# Patient Record
Sex: MALE | Race: WHITE | NOT HISPANIC OR LATINO | Employment: OTHER | URBAN - METROPOLITAN AREA
[De-identification: names, ages, dates, MRNs, and addresses within clinical notes are randomized per-mention and may not be internally consistent; named-entity substitution may affect disease eponyms.]

---
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
## 2024-05-03 ENCOUNTER — HOSPITAL ENCOUNTER (INPATIENT)
Facility: HOSPITAL | Age: 54
LOS: 3 days | DRG: 683 | End: 2024-05-06
Attending: EMERGENCY MEDICINE | Admitting: INTERNAL MEDICINE
Payer: MEDICARE

## 2024-05-03 ENCOUNTER — APPOINTMENT (INPATIENT)
Dept: RADIOLOGY | Facility: HOSPITAL | Age: 54
DRG: 683 | End: 2024-05-03
Payer: MEDICARE

## 2024-05-03 ENCOUNTER — APPOINTMENT (EMERGENCY)
Dept: RADIOLOGY | Facility: HOSPITAL | Age: 54
DRG: 683 | End: 2024-05-03
Payer: MEDICARE

## 2024-05-03 DIAGNOSIS — E87.1 HYPONATREMIA: ICD-10-CM

## 2024-05-03 DIAGNOSIS — R44.0 AUDITORY HALLUCINATION: ICD-10-CM

## 2024-05-03 DIAGNOSIS — R45.851 SUICIDAL IDEATION: Primary | ICD-10-CM

## 2024-05-03 DIAGNOSIS — Z00.8 MEDICAL CLEARANCE FOR PSYCHIATRIC ADMISSION: ICD-10-CM

## 2024-05-03 DIAGNOSIS — N17.9 AKI (ACUTE KIDNEY INJURY) (HCC): ICD-10-CM

## 2024-05-03 DIAGNOSIS — D72.829 LEUKOCYTOSIS: ICD-10-CM

## 2024-05-03 PROBLEM — M79.641 BILATERAL HAND PAIN: Status: ACTIVE | Noted: 2024-05-03

## 2024-05-03 PROBLEM — M79.642 BILATERAL HAND PAIN: Status: ACTIVE | Noted: 2024-05-03

## 2024-05-03 PROBLEM — F25.9 SCHIZOAFFECTIVE DISORDER (HCC): Status: ACTIVE | Noted: 2024-05-03

## 2024-05-03 PROBLEM — F11.10 OPIOID ABUSE (HCC): Status: ACTIVE | Noted: 2024-05-03

## 2024-05-03 PROBLEM — M62.82 RHABDOMYOLYSIS: Status: ACTIVE | Noted: 2024-05-03

## 2024-05-03 LAB
ALBUMIN SERPL BCP-MCNC: 4.3 G/DL (ref 3.5–5)
ALP SERPL-CCNC: 79 U/L (ref 34–104)
ALT SERPL W P-5'-P-CCNC: 44 U/L (ref 7–52)
AMPHETAMINES SERPL QL SCN: NEGATIVE
ANION GAP SERPL CALCULATED.3IONS-SCNC: 10 MMOL/L (ref 4–13)
ANION GAP SERPL CALCULATED.3IONS-SCNC: 11 MMOL/L (ref 4–13)
AST SERPL W P-5'-P-CCNC: 116 U/L (ref 13–39)
ATRIAL RATE: 93 BPM
BACTERIA UR QL AUTO: ABNORMAL /HPF
BARBITURATES UR QL: POSITIVE
BASOPHILS # BLD AUTO: 0.06 THOUSANDS/ÂΜL (ref 0–0.1)
BASOPHILS NFR BLD AUTO: 0 % (ref 0–1)
BENZODIAZ UR QL: NEGATIVE
BILIRUB SERPL-MCNC: 0.83 MG/DL (ref 0.2–1)
BILIRUB UR QL STRIP: NEGATIVE
BUN SERPL-MCNC: 20 MG/DL (ref 5–25)
BUN SERPL-MCNC: 30 MG/DL (ref 5–25)
CALCIUM SERPL-MCNC: 8.3 MG/DL (ref 8.4–10.2)
CALCIUM SERPL-MCNC: 9 MG/DL (ref 8.4–10.2)
CHLORIDE SERPL-SCNC: 103 MMOL/L (ref 96–108)
CHLORIDE SERPL-SCNC: 92 MMOL/L (ref 96–108)
CK SERPL-CCNC: 6000 U/L (ref 39–308)
CLARITY UR: CLEAR
CO2 SERPL-SCNC: 22 MMOL/L (ref 21–32)
CO2 SERPL-SCNC: 24 MMOL/L (ref 21–32)
COARSE GRAN CASTS URNS QL MICRO: ABNORMAL /LPF
COCAINE UR QL: POSITIVE
COLOR UR: YELLOW
CREAT SERPL-MCNC: 1.32 MG/DL (ref 0.6–1.3)
CREAT SERPL-MCNC: 1.85 MG/DL (ref 0.6–1.3)
EOSINOPHIL # BLD AUTO: 0.02 THOUSAND/ÂΜL (ref 0–0.61)
EOSINOPHIL NFR BLD AUTO: 0 % (ref 0–6)
ERYTHROCYTE [DISTWIDTH] IN BLOOD BY AUTOMATED COUNT: 11.9 % (ref 11.6–15.1)
ETHANOL EXG-MCNC: 0 MG/DL
FENTANYL UR QL SCN: NEGATIVE
GFR SERPL CREATININE-BSD FRML MDRD: 40 ML/MIN/1.73SQ M
GFR SERPL CREATININE-BSD FRML MDRD: 61 ML/MIN/1.73SQ M
GLUCOSE SERPL-MCNC: 148 MG/DL (ref 65–140)
GLUCOSE SERPL-MCNC: 96 MG/DL (ref 65–140)
GLUCOSE UR STRIP-MCNC: NEGATIVE MG/DL
HCT VFR BLD AUTO: 38.9 % (ref 36.5–49.3)
HGB BLD-MCNC: 12.9 G/DL (ref 12–17)
HGB UR QL STRIP.AUTO: 250
HYALINE CASTS #/AREA URNS LPF: ABNORMAL /LPF
HYDROCODONE UR QL SCN: NEGATIVE
IMM GRANULOCYTES # BLD AUTO: 0.13 THOUSAND/UL (ref 0–0.2)
IMM GRANULOCYTES NFR BLD AUTO: 1 % (ref 0–2)
KETONES UR STRIP-MCNC: NEGATIVE MG/DL
LEUKOCYTE ESTERASE UR QL STRIP: NEGATIVE
LYMPHOCYTES # BLD AUTO: 2.99 THOUSANDS/ÂΜL (ref 0.6–4.47)
LYMPHOCYTES NFR BLD AUTO: 14 % (ref 14–44)
MCH RBC QN AUTO: 32.1 PG (ref 26.8–34.3)
MCHC RBC AUTO-ENTMCNC: 33.2 G/DL (ref 31.4–37.4)
MCV RBC AUTO: 97 FL (ref 82–98)
METHADONE UR QL: NEGATIVE
MONOCYTES # BLD AUTO: 1.87 THOUSAND/ÂΜL (ref 0.17–1.22)
MONOCYTES NFR BLD AUTO: 9 % (ref 4–12)
MUCOUS THREADS UR QL AUTO: ABNORMAL
NEUTROPHILS # BLD AUTO: 16.23 THOUSANDS/ÂΜL (ref 1.85–7.62)
NEUTS SEG NFR BLD AUTO: 76 % (ref 43–75)
NITRITE UR QL STRIP: NEGATIVE
NON-SQ EPI CELLS URNS QL MICRO: ABNORMAL /HPF
NRBC BLD AUTO-RTO: 0 /100 WBCS
OPIATES UR QL SCN: NEGATIVE
OSMOLALITY UR/SERPL-RTO: 288 MMOL/KG (ref 282–298)
OSMOLALITY UR: 151 MMOL/KG
OTHER STN SPEC: ABNORMAL
OXYCODONE+OXYMORPHONE UR QL SCN: NEGATIVE
P AXIS: 22 DEGREES
PCP UR QL: NEGATIVE
PH UR STRIP.AUTO: 5 [PH]
PLATELET # BLD AUTO: 316 THOUSANDS/UL (ref 149–390)
PMV BLD AUTO: 8.8 FL (ref 8.9–12.7)
POTASSIUM SERPL-SCNC: 3.8 MMOL/L (ref 3.5–5.3)
POTASSIUM SERPL-SCNC: 4.4 MMOL/L (ref 3.5–5.3)
PR INTERVAL: 150 MS
PROT SERPL-MCNC: 7.3 G/DL (ref 6.4–8.4)
PROT UR STRIP-MCNC: ABNORMAL MG/DL
QRS AXIS: 46 DEGREES
QRSD INTERVAL: 88 MS
QT INTERVAL: 344 MS
QTC INTERVAL: 427 MS
RBC # BLD AUTO: 4.02 MILLION/UL (ref 3.88–5.62)
RBC #/AREA URNS AUTO: ABNORMAL /HPF
SODIUM 24H UR-SCNC: 16 MOL/L
SODIUM SERPL-SCNC: 125 MMOL/L (ref 135–147)
SODIUM SERPL-SCNC: 137 MMOL/L (ref 135–147)
SP GR UR STRIP.AUTO: 1.01 (ref 1–1.04)
T WAVE AXIS: 22 DEGREES
THC UR QL: NEGATIVE
TSH SERPL DL<=0.05 MIU/L-ACNC: 1.3 UIU/ML (ref 0.45–4.5)
UROBILINOGEN UA: NEGATIVE MG/DL
VENTRICULAR RATE: 93 BPM
WBC # BLD AUTO: 21.3 THOUSAND/UL (ref 4.31–10.16)
WBC #/AREA URNS AUTO: ABNORMAL /HPF

## 2024-05-03 PROCEDURE — 81001 URINALYSIS AUTO W/SCOPE: CPT

## 2024-05-03 PROCEDURE — 96360 HYDRATION IV INFUSION INIT: CPT

## 2024-05-03 PROCEDURE — 36415 COLL VENOUS BLD VENIPUNCTURE: CPT

## 2024-05-03 PROCEDURE — 85025 COMPLETE CBC W/AUTO DIFF WBC: CPT

## 2024-05-03 PROCEDURE — 84443 ASSAY THYROID STIM HORMONE: CPT

## 2024-05-03 PROCEDURE — 99285 EMERGENCY DEPT VISIT HI MDM: CPT

## 2024-05-03 PROCEDURE — 83935 ASSAY OF URINE OSMOLALITY: CPT | Performed by: INTERNAL MEDICINE

## 2024-05-03 PROCEDURE — 93005 ELECTROCARDIOGRAM TRACING: CPT

## 2024-05-03 PROCEDURE — 80053 COMPREHEN METABOLIC PANEL: CPT

## 2024-05-03 PROCEDURE — 80048 BASIC METABOLIC PNL TOTAL CA: CPT | Performed by: NURSE PRACTITIONER

## 2024-05-03 PROCEDURE — 73120 X-RAY EXAM OF HAND: CPT

## 2024-05-03 PROCEDURE — 83930 ASSAY OF BLOOD OSMOLALITY: CPT | Performed by: INTERNAL MEDICINE

## 2024-05-03 PROCEDURE — 99223 1ST HOSP IP/OBS HIGH 75: CPT | Performed by: NURSE PRACTITIONER

## 2024-05-03 PROCEDURE — 84300 ASSAY OF URINE SODIUM: CPT | Performed by: INTERNAL MEDICINE

## 2024-05-03 PROCEDURE — 81003 URINALYSIS AUTO W/O SCOPE: CPT

## 2024-05-03 PROCEDURE — 93010 ELECTROCARDIOGRAM REPORT: CPT

## 2024-05-03 PROCEDURE — 71046 X-RAY EXAM CHEST 2 VIEWS: CPT

## 2024-05-03 PROCEDURE — 82075 ASSAY OF BREATH ETHANOL: CPT

## 2024-05-03 PROCEDURE — 82550 ASSAY OF CK (CPK): CPT | Performed by: INTERNAL MEDICINE

## 2024-05-03 PROCEDURE — 80307 DRUG TEST PRSMV CHEM ANLYZR: CPT

## 2024-05-03 PROCEDURE — 80076 HEPATIC FUNCTION PANEL: CPT | Performed by: INTERNAL MEDICINE

## 2024-05-03 PROCEDURE — 82550 ASSAY OF CK (CPK): CPT

## 2024-05-03 RX ORDER — DULOXETIN HYDROCHLORIDE 30 MG/1
30 CAPSULE, DELAYED RELEASE ORAL DAILY
COMMUNITY
Start: 2024-02-27 | End: 2024-05-10

## 2024-05-03 RX ORDER — HYDROXYZINE HYDROCHLORIDE 10 MG/1
10 TABLET, FILM COATED ORAL
COMMUNITY
Start: 2024-05-02 | End: 2024-05-10

## 2024-05-03 RX ORDER — ESCITALOPRAM OXALATE 5 MG/1
5 TABLET ORAL DAILY
COMMUNITY
Start: 2024-03-12 | End: 2024-05-10

## 2024-05-03 RX ORDER — LABETALOL HYDROCHLORIDE 5 MG/ML
10 INJECTION, SOLUTION INTRAVENOUS ONCE
Status: DISCONTINUED | OUTPATIENT
Start: 2024-05-03 | End: 2024-05-04

## 2024-05-03 RX ORDER — NICOTINE 21 MG/24HR
14 PATCH, TRANSDERMAL 24 HOURS TRANSDERMAL ONCE
Status: DISCONTINUED | OUTPATIENT
Start: 2024-05-03 | End: 2024-05-03

## 2024-05-03 RX ORDER — BUPROPION HYDROCHLORIDE 150 MG/1
150 TABLET ORAL EVERY MORNING
Status: DISCONTINUED | OUTPATIENT
Start: 2024-05-04 | End: 2024-05-05

## 2024-05-03 RX ORDER — BUPROPION HYDROCHLORIDE 150 MG/1
150 TABLET ORAL EVERY MORNING
COMMUNITY
Start: 2024-05-02 | End: 2024-05-10

## 2024-05-03 RX ORDER — GABAPENTIN 300 MG/1
300 CAPSULE ORAL 2 TIMES DAILY
Status: DISCONTINUED | OUTPATIENT
Start: 2024-05-03 | End: 2024-05-06 | Stop reason: HOSPADM

## 2024-05-03 RX ORDER — SODIUM CHLORIDE 9 MG/ML
125 INJECTION, SOLUTION INTRAVENOUS CONTINUOUS
Status: DISCONTINUED | OUTPATIENT
Start: 2024-05-03 | End: 2024-05-04

## 2024-05-03 RX ORDER — ACETAMINOPHEN 325 MG/1
650 TABLET ORAL EVERY 6 HOURS PRN
Status: DISCONTINUED | OUTPATIENT
Start: 2024-05-03 | End: 2024-05-06 | Stop reason: HOSPADM

## 2024-05-03 RX ORDER — LORAZEPAM 0.5 MG/1
0.5 TABLET ORAL DAILY PRN
COMMUNITY
Start: 2024-03-28 | End: 2024-05-10

## 2024-05-03 RX ORDER — PANTOPRAZOLE SODIUM 40 MG/1
40 TABLET, DELAYED RELEASE ORAL
Status: DISCONTINUED | OUTPATIENT
Start: 2024-05-04 | End: 2024-05-06 | Stop reason: HOSPADM

## 2024-05-03 RX ORDER — NORTRIPTYLINE HYDROCHLORIDE 25 MG/1
25 CAPSULE ORAL DAILY
Status: ON HOLD | COMMUNITY
Start: 2024-05-02 | End: 2024-05-09

## 2024-05-03 RX ORDER — LORAZEPAM 2 MG/ML
2 INJECTION INTRAMUSCULAR EVERY 6 HOURS PRN
Status: DISCONTINUED | OUTPATIENT
Start: 2024-05-03 | End: 2024-05-05

## 2024-05-03 RX ORDER — LANOLIN ALCOHOL/MO/W.PET/CERES
6 CREAM (GRAM) TOPICAL
Status: DISCONTINUED | OUTPATIENT
Start: 2024-05-03 | End: 2024-05-06 | Stop reason: HOSPADM

## 2024-05-03 RX ORDER — OMEPRAZOLE 20 MG/1
20 CAPSULE, DELAYED RELEASE ORAL 2 TIMES DAILY
Status: ON HOLD | COMMUNITY
Start: 2024-04-18 | End: 2024-05-09

## 2024-05-03 RX ORDER — LABETALOL HYDROCHLORIDE 5 MG/ML
10 INJECTION, SOLUTION INTRAVENOUS EVERY 4 HOURS PRN
Status: DISCONTINUED | OUTPATIENT
Start: 2024-05-03 | End: 2024-05-06 | Stop reason: HOSPADM

## 2024-05-03 RX ORDER — LORAZEPAM 0.5 MG/1
0.5 TABLET ORAL ONCE
Status: COMPLETED | OUTPATIENT
Start: 2024-05-03 | End: 2024-05-03

## 2024-05-03 RX ORDER — NICOTINE 21 MG/24HR
14 PATCH, TRANSDERMAL 24 HOURS TRANSDERMAL DAILY
Status: DISCONTINUED | OUTPATIENT
Start: 2024-05-03 | End: 2024-05-04

## 2024-05-03 RX ORDER — ALBUTEROL SULFATE 90 UG/1
2 AEROSOL, METERED RESPIRATORY (INHALATION) EVERY 6 HOURS PRN
COMMUNITY
End: 2024-05-10

## 2024-05-03 RX ORDER — HEPARIN SODIUM 5000 [USP'U]/ML
5000 INJECTION, SOLUTION INTRAVENOUS; SUBCUTANEOUS EVERY 8 HOURS SCHEDULED
Status: DISCONTINUED | OUTPATIENT
Start: 2024-05-03 | End: 2024-05-06 | Stop reason: HOSPADM

## 2024-05-03 RX ORDER — ALBUTEROL SULFATE 90 UG/1
2 AEROSOL, METERED RESPIRATORY (INHALATION) EVERY 6 HOURS PRN
Status: DISCONTINUED | OUTPATIENT
Start: 2024-05-03 | End: 2024-05-06 | Stop reason: HOSPADM

## 2024-05-03 RX ORDER — BUPRENORPHINE AND NALOXONE 8; 2 MG/1; MG/1
1 FILM, SOLUBLE BUCCAL; SUBLINGUAL 2 TIMES DAILY
COMMUNITY
End: 2024-05-10

## 2024-05-03 RX ORDER — BUPROPION HYDROCHLORIDE 150 MG/1
150 TABLET, FILM COATED, EXTENDED RELEASE ORAL DAILY
COMMUNITY
Start: 2024-03-24 | End: 2024-05-10

## 2024-05-03 RX ORDER — ZOLPIDEM TARTRATE 5 MG/1
5 TABLET ORAL
COMMUNITY
Start: 2024-02-29 | End: 2024-05-10

## 2024-05-03 RX ORDER — GABAPENTIN 300 MG/1
300 CAPSULE ORAL 3 TIMES DAILY
COMMUNITY
Start: 2024-05-02 | End: 2024-05-10

## 2024-05-03 RX ORDER — FLUTICASONE PROPIONATE 50 MCG
2 SPRAY, SUSPENSION (ML) NASAL DAILY
Status: ON HOLD | COMMUNITY
Start: 2024-05-02 | End: 2024-05-09

## 2024-05-03 RX ORDER — FLUTICASONE PROPIONATE 50 MCG
2 SPRAY, SUSPENSION (ML) NASAL DAILY
Status: DISCONTINUED | OUTPATIENT
Start: 2024-05-04 | End: 2024-05-06 | Stop reason: HOSPADM

## 2024-05-03 RX ORDER — NORTRIPTYLINE HYDROCHLORIDE 25 MG/1
25 CAPSULE ORAL DAILY
Status: DISCONTINUED | OUTPATIENT
Start: 2024-05-04 | End: 2024-05-06 | Stop reason: HOSPADM

## 2024-05-03 RX ORDER — OLANZAPINE 10 MG/1
10 TABLET ORAL
COMMUNITY
Start: 2024-05-02 | End: 2024-05-10

## 2024-05-03 RX ADMIN — SODIUM CHLORIDE 125 ML/HR: 0.9 INJECTION, SOLUTION INTRAVENOUS at 15:10

## 2024-05-03 RX ADMIN — ACETAMINOPHEN 650 MG: 325 TABLET ORAL at 17:18

## 2024-05-03 RX ADMIN — GABAPENTIN 300 MG: 300 CAPSULE ORAL at 17:18

## 2024-05-03 RX ADMIN — MELATONIN TAB 3 MG 6 MG: 3 TAB at 22:02

## 2024-05-03 RX ADMIN — SODIUM CHLORIDE 1000 ML: 0.9 INJECTION, SOLUTION INTRAVENOUS at 13:08

## 2024-05-03 RX ADMIN — LORAZEPAM 0.5 MG: 0.5 TABLET ORAL at 22:03

## 2024-05-03 RX ADMIN — SODIUM CHLORIDE 125 ML/HR: 0.9 INJECTION, SOLUTION INTRAVENOUS at 22:00

## 2024-05-03 RX ADMIN — ACETAMINOPHEN 650 MG: 325 TABLET ORAL at 22:01

## 2024-05-03 NOTE — ASSESSMENT & PLAN NOTE
With hallucinations and suicidal ideation on admission  Consult psychiatry  Monitor on 1:1 for safety  Patient was discharged from [facility] on 5/2/2024  Medication list indicates he was taking Wellbutrin  mg twice daily, nortriptyline 25 mg daily, gabapentin 300 mg twice daily, Ativan and Atarax as needed for anxiety

## 2024-05-03 NOTE — QUICK NOTE
This writer was made aware of psychiatric consultation by inpatient medicine attending (Remi) pertaining to suicidal ideation in the setting of command auditory hallucinations in the presence of polysubstance abuse; UDS is positive for cocaine and barbiturates. At this time, substance induced mood disorder/psychosis cannot be excluded in addition to the possibility of delirium in presence of various derangements; sodium 125, white blood cells 21.3. Dr. Khalil is in agreement to have psychiatric consultation completed in the a.m; Dr. Alegre with psychiatry made aware. Nonetheless, this writer recommends continuation of one-to-one observation to assure patient safety.     Jordan Christopher Holter, DO

## 2024-05-03 NOTE — PLAN OF CARE
Problem: PAIN - ADULT  Goal: Verbalizes/displays adequate comfort level or baseline comfort level  Description: Interventions:  - Encourage patient to monitor pain and request assistance  - Assess pain using appropriate pain scale  - Administer analgesics based on type and severity of pain and evaluate response  - Implement non-pharmacological measures as appropriate and evaluate response  - Consider cultural and social influences on pain and pain management  - Notify physician/advanced practitioner if interventions unsuccessful or patient reports new pain  Outcome: Progressing     Problem: INFECTION - ADULT  Goal: Absence or prevention of progression during hospitalization  Description: INTERVENTIONS:  - Assess and monitor for signs and symptoms of infection  - Monitor lab/diagnostic results  - Monitor all insertion sites, i.e. indwelling lines, tubes, and drains  - Monitor endotracheal if appropriate and nasal secretions for changes in amount and color  - [place] appropriate cooling/warming therapies per order  - Administer medications as ordered  - Instruct and encourage patient and family to use good hand hygiene technique  - Identify and instruct in appropriate isolation precautions for identified infection/condition  Outcome: Progressing  Goal: Absence of fever/infection during neutropenic period  Description: INTERVENTIONS:  - Monitor WBC    Outcome: Progressing     Problem: SAFETY ADULT  Goal: Patient will remain free of falls  Description: INTERVENTIONS:  - Educate patient/family on patient safety including physical limitations  - Instruct patient to call for assistance with activity   - Consult OT/PT to assist with strengthening/mobility   - Keep Call bell within reach  - Keep bed low and locked with side rails adjusted as appropriate  - Keep care items and personal belongings within reach  - Initiate and maintain comfort rounds  - Make Fall Risk Sign visible to staff  - Offer Toileting every 2 Hours,  in advance of need  - Initiate/Maintain bed alarm  - Obtain necessary fall risk management equipment: bed alarm  - Apply yellow socks and bracelet for high fall risk patients  - Consider moving patient to room near nurses station  Outcome: Progressing  Goal: Maintain or return to baseline ADL function  Description: INTERVENTIONS:  -  Assess patient's ability to carry out ADLs; assess patient's baseline for ADL function and identify physical deficits which impact ability to perform ADLs (bathing, care of mouth/teeth, toileting, grooming, dressing, etc.)  - Assess/evaluate cause of self-care deficits   - Assess range of motion  - Assess patient's mobility; develop plan if impaired  - Assess patient's need for assistive devices and provide as appropriate  - Encourage maximum independence but intervene and supervise when necessary  - Involve family in performance of ADLs  - Assess for home care needs following discharge   - Consider OT consult to assist with ADL evaluation and planning for discharge  - Provide patient education as appropriate  Outcome: Progressing  Goal: Maintains/Returns to pre admission functional level  Description: INTERVENTIONS:  - Perform AM-PAC 6 Click Basic Mobility/ Daily Activity assessment daily.  - Set and communicate daily mobility goal to care team and patient/family/caregiver.   - Collaborate with rehabilitation services on mobility goals if consulted  - Perform Range of Motion 2 times a day.  - Reposition patient every 2 hours.  - Dangle patient 2 times a day  - Stand patient 2 times a day  - Ambulate patient 2 times a day  - Out of bed to chair 2 times a day   - Out of bed for meals 2 times a day  - Out of bed for toileting  - Record patient progress and toleration of activity level   Outcome: Progressing     Problem: DISCHARGE PLANNING  Goal: Discharge to home or other facility with appropriate resources  Description: INTERVENTIONS:  - Identify barriers to discharge w/patient and  caregiver  - Arrange for needed discharge resources and transportation as appropriate  - Identify discharge learning needs (meds, wound care, etc.)  - Arrange for interpretive services to assist at discharge as needed  - Refer to Case Management Department for coordinating discharge planning if the patient needs post-hospital services based on physician/advanced practitioner order or complex needs related to functional status, cognitive ability, or social support system  Outcome: Progressing     Problem: Knowledge Deficit  Goal: Patient/family/caregiver demonstrates understanding of disease process, treatment plan, medications, and discharge instructions  Description: Complete learning assessment and assess knowledge base.  Interventions:  - Provide teaching at level of understanding  - Provide teaching via preferred learning methods  Outcome: Progressing
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
## 2024-05-03 NOTE — ASSESSMENT & PLAN NOTE
POA, CK 6000  No complaints of significant pain aside from his hands  Continue IV fluids  Encourage oral intake  Check CK in a.m.

## 2024-05-03 NOTE — ED NOTES
Per Yamini Laguerre PA-C, the patient requires a medical admission. Crisis assessment has been cancelled.

## 2024-05-03 NOTE — ASSESSMENT & PLAN NOTE
POA, sodium 125  Given normal saline 1 L bolus in the ED.  Will continue normal saline at 125 MLS per hour.  Will monitor sodium every 8 hours.  Consult nephrology  Check urine sodium, urine osmole, and serum osmo

## 2024-05-03 NOTE — Clinical Note
Case was discussed with Dr. Alma Dietrich and the patient's admission status was agreed to be Admission Status: inpatient status to the service of Dr. Dietrich .

## 2024-05-03 NOTE — ASSESSMENT & PLAN NOTE
POA, Cr 1.85 with a baseline near 1.2-1.35  Likely in the setting of rhabdomyolysis   Given normal saline 1 L bolus in the ED  Will continue normal saline at 125 mL/hr per hour  Nephrology consult  Avoid nephrotoxic agents and hypotension
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
## 2024-05-03 NOTE — ASSESSMENT & PLAN NOTE
UDS (+) for barbiturates and cocaine  Substance cessation education and counseling   Patient previously on Suboxone unable to tell me the time of his last dose

## 2024-05-03 NOTE — ED NOTES
[place]  [place].  Recipient #[id number]    managed by [facility]    Primary payor is [employer] [facility]

## 2024-05-04 PROBLEM — D72.829 LEUKOCYTOSIS: Status: ACTIVE | Noted: 2024-05-04

## 2024-05-04 PROBLEM — E87.6 HYPOKALEMIA: Status: ACTIVE | Noted: 2024-05-04

## 2024-05-04 PROBLEM — D72.829 LEUKOCYTOSIS: Status: RESOLVED | Noted: 2024-05-04 | Resolved: 2024-05-04

## 2024-05-04 LAB
ALBUMIN SERPL BCP-MCNC: 3.8 G/DL (ref 3.5–5)
ALP SERPL-CCNC: 78 U/L (ref 34–104)
ALT SERPL W P-5'-P-CCNC: 47 U/L (ref 7–52)
ANION GAP SERPL CALCULATED.3IONS-SCNC: 14 MMOL/L (ref 4–13)
ANION GAP SERPL CALCULATED.3IONS-SCNC: 9 MMOL/L (ref 4–13)
AST SERPL W P-5'-P-CCNC: 151 U/L (ref 13–39)
BASOPHILS # BLD AUTO: 0.08 THOUSANDS/ÂΜL (ref 0–0.1)
BASOPHILS NFR BLD AUTO: 1 % (ref 0–1)
BILIRUB DIRECT SERPL-MCNC: 0.12 MG/DL (ref 0–0.2)
BILIRUB SERPL-MCNC: 0.73 MG/DL (ref 0.2–1)
BUN SERPL-MCNC: 18 MG/DL (ref 5–25)
BUN SERPL-MCNC: 27 MG/DL (ref 5–25)
CALCIUM SERPL-MCNC: 8.1 MG/DL (ref 8.4–10.2)
CALCIUM SERPL-MCNC: 8.6 MG/DL (ref 8.4–10.2)
CHLORIDE SERPL-SCNC: 107 MMOL/L (ref 96–108)
CHLORIDE SERPL-SCNC: 99 MMOL/L (ref 96–108)
CK SERPL-CCNC: 4919 U/L (ref 39–308)
CK SERPL-CCNC: 7331 U/L (ref 39–308)
CO2 SERPL-SCNC: 20 MMOL/L (ref 21–32)
CO2 SERPL-SCNC: 25 MMOL/L (ref 21–32)
CREAT SERPL-MCNC: 1.11 MG/DL (ref 0.6–1.3)
CREAT SERPL-MCNC: 1.6 MG/DL (ref 0.6–1.3)
EOSINOPHIL # BLD AUTO: 0.15 THOUSAND/ÂΜL (ref 0–0.61)
EOSINOPHIL NFR BLD AUTO: 3 % (ref 0–6)
ERYTHROCYTE [DISTWIDTH] IN BLOOD BY AUTOMATED COUNT: 12.2 % (ref 11.6–15.1)
GFR SERPL CREATININE-BSD FRML MDRD: 48 ML/MIN/1.73SQ M
GFR SERPL CREATININE-BSD FRML MDRD: 75 ML/MIN/1.73SQ M
GLUCOSE SERPL-MCNC: 105 MG/DL (ref 65–140)
GLUCOSE SERPL-MCNC: 95 MG/DL (ref 65–140)
HCT VFR BLD AUTO: 37.7 % (ref 36.5–49.3)
HGB BLD-MCNC: 11.9 G/DL (ref 12–17)
IMM GRANULOCYTES # BLD AUTO: 0.02 THOUSAND/UL (ref 0–0.2)
IMM GRANULOCYTES NFR BLD AUTO: 0 % (ref 0–2)
LYMPHOCYTES # BLD AUTO: 2.37 THOUSANDS/ÂΜL (ref 0.6–4.47)
LYMPHOCYTES NFR BLD AUTO: 39 % (ref 14–44)
MCH RBC QN AUTO: 31.8 PG (ref 26.8–34.3)
MCHC RBC AUTO-ENTMCNC: 31.6 G/DL (ref 31.4–37.4)
MCV RBC AUTO: 101 FL (ref 82–98)
MONOCYTES # BLD AUTO: 0.75 THOUSAND/ÂΜL (ref 0.17–1.22)
MONOCYTES NFR BLD AUTO: 12 % (ref 4–12)
NEUTROPHILS # BLD AUTO: 2.67 THOUSANDS/ÂΜL (ref 1.85–7.62)
NEUTS SEG NFR BLD AUTO: 45 % (ref 43–75)
NRBC BLD AUTO-RTO: 0 /100 WBCS
PLATELET # BLD AUTO: 272 THOUSANDS/UL (ref 149–390)
PMV BLD AUTO: 9.3 FL (ref 8.9–12.7)
POTASSIUM SERPL-SCNC: 3.4 MMOL/L (ref 3.5–5.3)
POTASSIUM SERPL-SCNC: 4 MMOL/L (ref 3.5–5.3)
PROT SERPL-MCNC: 6.2 G/DL (ref 6.4–8.4)
RBC # BLD AUTO: 3.74 MILLION/UL (ref 3.88–5.62)
SODIUM SERPL-SCNC: 133 MMOL/L (ref 135–147)
SODIUM SERPL-SCNC: 139 MMOL/L (ref 135–147)
SODIUM SERPL-SCNC: 141 MMOL/L (ref 135–147)
WBC # BLD AUTO: 6.04 THOUSAND/UL (ref 4.31–10.16)

## 2024-05-04 PROCEDURE — 99232 SBSQ HOSP IP/OBS MODERATE 35: CPT | Performed by: NURSE PRACTITIONER

## 2024-05-04 PROCEDURE — 84295 ASSAY OF SERUM SODIUM: CPT | Performed by: INTERNAL MEDICINE

## 2024-05-04 PROCEDURE — 80048 BASIC METABOLIC PNL TOTAL CA: CPT | Performed by: NURSE PRACTITIONER

## 2024-05-04 PROCEDURE — 85025 COMPLETE CBC W/AUTO DIFF WBC: CPT | Performed by: INTERNAL MEDICINE

## 2024-05-04 PROCEDURE — 99223 1ST HOSP IP/OBS HIGH 75: CPT | Performed by: NURSE PRACTITIONER

## 2024-05-04 PROCEDURE — 82550 ASSAY OF CK (CPK): CPT | Performed by: NURSE PRACTITIONER

## 2024-05-04 RX ORDER — DEXTROSE AND SODIUM CHLORIDE 5; .45 G/100ML; G/100ML
125 INJECTION, SOLUTION INTRAVENOUS CONTINUOUS
Status: DISCONTINUED | OUTPATIENT
Start: 2024-05-04 | End: 2024-05-06 | Stop reason: HOSPADM

## 2024-05-04 RX ORDER — VANCOMYCIN HYDROCHLORIDE 125 MG/1
125 CAPSULE ORAL EVERY 6 HOURS SCHEDULED
Status: DISCONTINUED | OUTPATIENT
Start: 2024-05-04 | End: 2024-05-05

## 2024-05-04 RX ORDER — POTASSIUM CHLORIDE 20 MEQ/1
40 TABLET, EXTENDED RELEASE ORAL ONCE
Status: COMPLETED | OUTPATIENT
Start: 2024-05-04 | End: 2024-05-04

## 2024-05-04 RX ORDER — CHLORHEXIDINE GLUCONATE ORAL RINSE 1.2 MG/ML
15 SOLUTION DENTAL EVERY 12 HOURS SCHEDULED
Status: DISCONTINUED | OUTPATIENT
Start: 2024-05-04 | End: 2024-05-06 | Stop reason: HOSPADM

## 2024-05-04 RX ORDER — OLANZAPINE 5 MG/1
5 TABLET ORAL
Status: DISCONTINUED | OUTPATIENT
Start: 2024-05-04 | End: 2024-05-05

## 2024-05-04 RX ADMIN — GABAPENTIN 300 MG: 300 CAPSULE ORAL at 17:12

## 2024-05-04 RX ADMIN — ACETAMINOPHEN 650 MG: 325 TABLET ORAL at 04:39

## 2024-05-04 RX ADMIN — LORAZEPAM 2 MG: 2 INJECTION INTRAMUSCULAR; INTRAVENOUS at 09:30

## 2024-05-04 RX ADMIN — BUPROPION HYDROCHLORIDE 150 MG: 150 TABLET, EXTENDED RELEASE ORAL at 09:22

## 2024-05-04 RX ADMIN — SODIUM CHLORIDE 125 ML/HR: 0.9 INJECTION, SOLUTION INTRAVENOUS at 04:38

## 2024-05-04 RX ADMIN — ACETAMINOPHEN 650 MG: 325 TABLET ORAL at 23:21

## 2024-05-04 RX ADMIN — OLANZAPINE 5 MG: 5 TABLET, FILM COATED ORAL at 23:21

## 2024-05-04 RX ADMIN — PANTOPRAZOLE SODIUM 40 MG: 40 TABLET, DELAYED RELEASE ORAL at 05:07

## 2024-05-04 RX ADMIN — FLUTICASONE PROPIONATE 2 SPRAY: 50 SPRAY, METERED NASAL at 09:22

## 2024-05-04 RX ADMIN — ASPIRIN 81 MG: 81 TABLET, COATED ORAL at 09:22

## 2024-05-04 RX ADMIN — DEXTROSE AND SODIUM CHLORIDE 125 ML/HR: 5; .45 INJECTION, SOLUTION INTRAVENOUS at 23:33

## 2024-05-04 RX ADMIN — DEXTROSE AND SODIUM CHLORIDE 125 ML/HR: 5; .45 INJECTION, SOLUTION INTRAVENOUS at 17:13

## 2024-05-04 RX ADMIN — CHLORHEXIDINE GLUCONATE 0.12% ORAL RINSE 15 ML: 1.2 LIQUID ORAL at 23:22

## 2024-05-04 RX ADMIN — NICOTINE POLACRILEX 2 MG: 2 GUM, CHEWING BUCCAL at 12:32

## 2024-05-04 RX ADMIN — VANCOMYCIN HYDROCHLORIDE 125 MG: 125 CAPSULE ORAL at 12:30

## 2024-05-04 RX ADMIN — POTASSIUM CHLORIDE 40 MEQ: 1500 TABLET, EXTENDED RELEASE ORAL at 09:22

## 2024-05-04 RX ADMIN — LORAZEPAM 2 MG: 2 INJECTION INTRAMUSCULAR; INTRAVENOUS at 17:12

## 2024-05-04 RX ADMIN — DEXTROSE AND SODIUM CHLORIDE 125 ML/HR: 5; .45 INJECTION, SOLUTION INTRAVENOUS at 09:27

## 2024-05-04 RX ADMIN — CHLORHEXIDINE GLUCONATE 0.12% ORAL RINSE 15 ML: 1.2 LIQUID ORAL at 12:30

## 2024-05-04 RX ADMIN — MELATONIN TAB 3 MG 6 MG: 3 TAB at 23:21

## 2024-05-04 RX ADMIN — VANCOMYCIN HYDROCHLORIDE 125 MG: 125 CAPSULE ORAL at 17:12

## 2024-05-04 RX ADMIN — VANCOMYCIN HYDROCHLORIDE 125 MG: 125 CAPSULE ORAL at 23:21

## 2024-05-04 RX ADMIN — GABAPENTIN 300 MG: 300 CAPSULE ORAL at 09:22

## 2024-05-04 NOTE — NURSING NOTE
The patient was asleep and cooperative with no signs of hallucination or abnormal behavior for the night shift. Patient only complained of pain, for which prn medication given. Additionally, patient requested for medication for anxiety and to help sleep, and prn given. Patient continue on the 1:1 as order.

## 2024-05-04 NOTE — ASSESSMENT & PLAN NOTE
POA, sodium 125  Serum Osmo 288, urine osmole 151, urine sodium 16  Na improved to 137 -> 141 with normal saline infusion  Continue IV fluids given rhabdomyolysis, change to D5 1/2 NS @ 125 mL/hr   Consult nephrology  Monitor sodium  Encourage oral intake

## 2024-05-04 NOTE — CONSULTS
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 a toxic alcohol, STOP. Test is not routinely indicated. Please consult medical  on call for further guidance.\"    Collection Time: 05/03/24  3:02 PM   Result Value Ref Range    Osmolality Serum 288 282 - 298 mmol/KG   CK    Collection Time: 05/03/24  3:02 PM   Result Value Ref Range    Total CK 7,331 (H) 39 - 308 U/L   Osmolality, urine    Collection Time: 05/03/24  4:38 PM   Result Value Ref Range    Osmolality, Ur 151 (L) 250 - 900 mmol/KG   Sodium, urine, random    Collection Time: 05/03/24  4:38 PM   Result Value Ref Range    Sodium, Ur 16 Reference range not established.   BMP Q8 hours X 3 (Hyponatremia monitoring)    Collection Time: 05/03/24  9:58 PM   Result Value Ref Range    Sodium 137 135 - 147 mmol/L    Potassium 3.8 3.5 - 5.3 mmol/L    Chloride 103 96 - 108 mmol/L    CO2 24 21 - 32 mmol/L    ANION GAP 10 4 - 13 mmol/L    BUN 20 5 - 25 mg/dL    Creatinine 1.32 (H) 0.60 - 1.30 mg/dL    Glucose 148 (H) 65 - 140 mg/dL    Calcium 8.3 (L) 8.4 - 10.2 mg/dL    eGFR 61 ml/min/1.73sq m   CBC and differential    Collection Time: 05/04/24  5:34 AM   Result Value Ref Range    WBC 6.04 4.31 - 10.16 Thousand/uL    RBC 3.74 (L) 3.88 - 5.62 Million/uL    Hemoglobin 11.9 (L) 12.0 - 17.0 g/dL    Hematocrit 37.7 36.5 - 49.3 %     (H) 82 - 98 fL    MCH 31.8 26.8 - 34.3 pg    MCHC " 31.6 31.4 - 37.4 g/dL    RDW 12.2 11.6 - 15.1 %    MPV 9.3 8.9 - 12.7 fL    Platelets 272 149 - 390 Thousands/uL    nRBC 0 /100 WBCs    Segmented % 45 43 - 75 %    Immature Grans % 0 0 - 2 %    Lymphocytes % 39 14 - 44 %    Monocytes % 12 4 - 12 %    Eosinophils Relative 3 0 - 6 %    Basophils Relative 1 0 - 1 %    Absolute Neutrophils 2.67 1.85 - 7.62 Thousands/µL    Absolute Immature Grans 0.02 0.00 - 0.20 Thousand/uL    Absolute Lymphocytes 2.37 0.60 - 4.47 Thousands/µL    Absolute Monocytes 0.75 0.17 - 1.22 Thousand/µL    Eosinophils Absolute 0.15 0.00 - 0.61 Thousand/µL    Basophils Absolute 0.08 0.00 - 0.10 Thousands/µL   BMP Q8 hours X 3 (Hyponatremia monitoring)    Collection Time: 05/04/24  5:34 AM   Result Value Ref Range    Sodium 141 135 - 147 mmol/L    Potassium 3.4 (L) 3.5 - 5.3 mmol/L    Chloride 107 96 - 108 mmol/L    CO2 25 21 - 32 mmol/L    ANION GAP 9 4 - 13 mmol/L    BUN 18 5 - 25 mg/dL    Creatinine 1.11 0.60 - 1.30 mg/dL    Glucose 105 65 - 140 mg/dL    Calcium 8.1 (L) 8.4 - 10.2 mg/dL    eGFR 75 ml/min/1.73sq m        Code Status: Level 1 - Full Code  Advance Directive and Living Will:      Power of :    POLST:        This note was not shared with the patient due to reasonable likelihood of causing patient harm      This note has been constructed in part using a voice recognition system.   There may be translation, syntax,  or grammatical errors. If you have any questions, please contact the dictating provider.    Riri Alegre MD  Department of Psychiatry and Behavioral Health  [facility]

## 2024-05-04 NOTE — ASSESSMENT & PLAN NOTE
POA, without signs of infectious source, likely hemoconcentration  WBC improved from 21.3 -> 6.04 with IV fluids   fever curve and CBC with differential

## 2024-05-04 NOTE — ASSESSMENT & PLAN NOTE
UDS (+) for barbiturates and cocaine  Substance cessation education and counseling   Patient previously on Suboxone unable to tell me the time of his last dose  Ativan as needed for anxiety/agitation/withdrawal
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
## 2024-05-04 NOTE — ASSESSMENT & PLAN NOTE
POA, Cr 1.85 with a baseline near 1.2  Likely in the setting of rhabdomyolysis   Cr improved to 1.32 -> 1.11 today with IVF  Change fluids to D5 1/2 NS @ 125 mL/hr   Nephrology consult pending   Avoid nephrotoxic agents and hypotension

## 2024-05-04 NOTE — ASSESSMENT & PLAN NOTE
With hallucinations and suicidal ideation on admission  Consult psychiatry  Monitor on 1:1 for safety  Patient was discharged from [facility] on 5/2/2024  Medication list indicates he was taking Wellbutrin  mg twice daily, nortriptyline 25 mg daily, gabapentin 300 mg twice daily, Ativan and Atarax as needed for anxiety, continue
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
## 2024-05-05 LAB
ALBUMIN SERPL BCP-MCNC: 3.3 G/DL (ref 3.5–5)
ALP SERPL-CCNC: 59 U/L (ref 34–104)
ALT SERPL W P-5'-P-CCNC: 36 U/L (ref 7–52)
ANION GAP SERPL CALCULATED.3IONS-SCNC: 11 MMOL/L (ref 4–13)
AST SERPL W P-5'-P-CCNC: 83 U/L (ref 13–39)
BASOPHILS # BLD AUTO: 0.08 THOUSANDS/ÂΜL (ref 0–0.1)
BASOPHILS NFR BLD AUTO: 1 % (ref 0–1)
BILIRUB SERPL-MCNC: 0.2 MG/DL (ref 0.2–1)
BUN SERPL-MCNC: 13 MG/DL (ref 5–25)
CALCIUM ALBUM COR SERPL-MCNC: 8.8 MG/DL (ref 8.3–10.1)
CALCIUM SERPL-MCNC: 8.2 MG/DL (ref 8.4–10.2)
CHLORIDE SERPL-SCNC: 107 MMOL/L (ref 96–108)
CK SERPL-CCNC: 2602 U/L (ref 39–308)
CO2 SERPL-SCNC: 22 MMOL/L (ref 21–32)
CREAT SERPL-MCNC: 0.95 MG/DL (ref 0.6–1.3)
EOSINOPHIL # BLD AUTO: 0.19 THOUSAND/ÂΜL (ref 0–0.61)
EOSINOPHIL NFR BLD AUTO: 3 % (ref 0–6)
ERYTHROCYTE [DISTWIDTH] IN BLOOD BY AUTOMATED COUNT: 11.8 % (ref 11.6–15.1)
GFR SERPL CREATININE-BSD FRML MDRD: 91 ML/MIN/1.73SQ M
GLUCOSE SERPL-MCNC: 127 MG/DL (ref 65–140)
HCT VFR BLD AUTO: 37 % (ref 36.5–49.3)
HGB BLD-MCNC: 11.2 G/DL (ref 12–17)
IMM GRANULOCYTES # BLD AUTO: 0.02 THOUSAND/UL (ref 0–0.2)
IMM GRANULOCYTES NFR BLD AUTO: 0 % (ref 0–2)
LYMPHOCYTES # BLD AUTO: 2.54 THOUSANDS/ÂΜL (ref 0.6–4.47)
LYMPHOCYTES NFR BLD AUTO: 38 % (ref 14–44)
MCH RBC QN AUTO: 31.2 PG (ref 26.8–34.3)
MCHC RBC AUTO-ENTMCNC: 30.3 G/DL (ref 31.4–37.4)
MCV RBC AUTO: 103 FL (ref 82–98)
MONOCYTES # BLD AUTO: 0.67 THOUSAND/ÂΜL (ref 0.17–1.22)
MONOCYTES NFR BLD AUTO: 10 % (ref 4–12)
NEUTROPHILS # BLD AUTO: 3.23 THOUSANDS/ÂΜL (ref 1.85–7.62)
NEUTS SEG NFR BLD AUTO: 48 % (ref 43–75)
NRBC BLD AUTO-RTO: 0 /100 WBCS
PLATELET # BLD AUTO: 239 THOUSANDS/UL (ref 149–390)
PMV BLD AUTO: 9.3 FL (ref 8.9–12.7)
POTASSIUM SERPL-SCNC: 3.3 MMOL/L (ref 3.5–5.3)
PROT SERPL-MCNC: 5.7 G/DL (ref 6.4–8.4)
RBC # BLD AUTO: 3.59 MILLION/UL (ref 3.88–5.62)
SODIUM SERPL-SCNC: 140 MMOL/L (ref 135–147)
WBC # BLD AUTO: 6.73 THOUSAND/UL (ref 4.31–10.16)

## 2024-05-05 PROCEDURE — 85025 COMPLETE CBC W/AUTO DIFF WBC: CPT | Performed by: INTERNAL MEDICINE

## 2024-05-05 PROCEDURE — 99232 SBSQ HOSP IP/OBS MODERATE 35: CPT | Performed by: INTERNAL MEDICINE

## 2024-05-05 PROCEDURE — 99232 SBSQ HOSP IP/OBS MODERATE 35: CPT | Performed by: NURSE PRACTITIONER

## 2024-05-05 PROCEDURE — 80053 COMPREHEN METABOLIC PANEL: CPT | Performed by: INTERNAL MEDICINE

## 2024-05-05 PROCEDURE — 82550 ASSAY OF CK (CPK): CPT | Performed by: INTERNAL MEDICINE

## 2024-05-05 PROCEDURE — NC001 PR NO CHARGE: Performed by: INTERNAL MEDICINE

## 2024-05-05 RX ORDER — POTASSIUM CHLORIDE 20 MEQ/1
40 TABLET, EXTENDED RELEASE ORAL ONCE
Status: COMPLETED | OUTPATIENT
Start: 2024-05-05 | End: 2024-05-05

## 2024-05-05 RX ORDER — LORAZEPAM 1 MG/1
2 TABLET ORAL EVERY 6 HOURS PRN
Status: DISCONTINUED | OUTPATIENT
Start: 2024-05-05 | End: 2024-05-06 | Stop reason: HOSPADM

## 2024-05-05 RX ORDER — BUPROPION HYDROCHLORIDE 150 MG/1
150 TABLET ORAL 2 TIMES DAILY
Status: DISCONTINUED | OUTPATIENT
Start: 2024-05-05 | End: 2024-05-06 | Stop reason: HOSPADM

## 2024-05-05 RX ORDER — OLANZAPINE 10 MG/1
10 TABLET ORAL
Status: DISCONTINUED | OUTPATIENT
Start: 2024-05-05 | End: 2024-05-06 | Stop reason: HOSPADM

## 2024-05-05 RX ORDER — LORAZEPAM 2 MG/ML
2 INJECTION INTRAMUSCULAR EVERY 6 HOURS PRN
Status: DISCONTINUED | OUTPATIENT
Start: 2024-05-05 | End: 2024-05-06 | Stop reason: HOSPADM

## 2024-05-05 RX ADMIN — ASPIRIN 81 MG: 81 TABLET, COATED ORAL at 08:54

## 2024-05-05 RX ADMIN — BUPROPION HYDROCHLORIDE 150 MG: 150 TABLET, EXTENDED RELEASE ORAL at 21:29

## 2024-05-05 RX ADMIN — GABAPENTIN 300 MG: 300 CAPSULE ORAL at 08:54

## 2024-05-05 RX ADMIN — DEXTROSE AND SODIUM CHLORIDE 125 ML/HR: 5; .45 INJECTION, SOLUTION INTRAVENOUS at 21:30

## 2024-05-05 RX ADMIN — DEXTROSE AND SODIUM CHLORIDE 125 ML/HR: 5; .45 INJECTION, SOLUTION INTRAVENOUS at 06:22

## 2024-05-05 RX ADMIN — LORAZEPAM 2 MG: 1 TABLET ORAL at 19:59

## 2024-05-05 RX ADMIN — GABAPENTIN 300 MG: 300 CAPSULE ORAL at 17:43

## 2024-05-05 RX ADMIN — CHLORHEXIDINE GLUCONATE 0.12% ORAL RINSE 15 ML: 1.2 LIQUID ORAL at 11:58

## 2024-05-05 RX ADMIN — OLANZAPINE 10 MG: 10 TABLET, FILM COATED ORAL at 21:29

## 2024-05-05 RX ADMIN — NORTRIPTYLINE HYDROCHLORIDE 25 MG: 25 CAPSULE ORAL at 08:54

## 2024-05-05 RX ADMIN — CHLORHEXIDINE GLUCONATE 0.12% ORAL RINSE 15 ML: 1.2 LIQUID ORAL at 17:43

## 2024-05-05 RX ADMIN — MELATONIN TAB 3 MG 6 MG: 3 TAB at 21:29

## 2024-05-05 RX ADMIN — VANCOMYCIN HYDROCHLORIDE 125 MG: 125 CAPSULE ORAL at 06:20

## 2024-05-05 RX ADMIN — VANCOMYCIN HYDROCHLORIDE 125 MG: 125 CAPSULE ORAL at 11:43

## 2024-05-05 RX ADMIN — LORAZEPAM 2 MG: 2 INJECTION INTRAMUSCULAR; INTRAVENOUS at 09:00

## 2024-05-05 RX ADMIN — POTASSIUM CHLORIDE 40 MEQ: 1500 TABLET, EXTENDED RELEASE ORAL at 11:43

## 2024-05-05 RX ADMIN — BUPROPION HYDROCHLORIDE 150 MG: 150 TABLET, EXTENDED RELEASE ORAL at 08:54

## 2024-05-05 RX ADMIN — PANTOPRAZOLE SODIUM 40 MG: 40 TABLET, DELAYED RELEASE ORAL at 06:20

## 2024-05-05 NOTE — ASSESSMENT & PLAN NOTE
With hallucinations and suicidal ideation on admission  Appreciate psychiatry input -> tentative plan for transfer to the behavioral health unit likely tomorrow once CK < 1000  Monitor on 1:1 for safety  Patient was just recently discharged from [facility] on 5/2/24  Medication list indicates he was taking Wellbutrin  mg twice daily, Nortriptyline 25 mg daily, Gabapentin 300 mg twice daily, and Ativan/Atarax as needed for anxiety

## 2024-05-05 NOTE — ASSESSMENT & PLAN NOTE
POA, CK 6000 -> 7,331 -> 2602 today   Continue IV fluids  Encourage oral hydration  Trend CK levels

## 2024-05-05 NOTE — NURSING NOTE
Patient request medicated mouthwash early after brushing teeth. Provider agreeable as long as patient aware that the next dose will be tomorrow AM. Patient was agreeable and provider updated.

## 2024-05-05 NOTE — NURSING NOTE
Patient has become verbally abuse and making verbal threats when explaining the door must remain partial open due to orders for moderate risk for SI. Demanding RN to immediately look for second pair of glass claiming they are$1000 glasses and that the hospital has to find them. Demanding that ativan should be given as a scheduled med and not as needed. Demanding the nurse retreive a second breakfast because that is what was done yesterday.
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
## 2024-05-05 NOTE — ASSESSMENT & PLAN NOTE
POA, Cr 1.85 with a baseline near 1.2  Likely in the setting of rhabdomyolysis   Cr improved to 1.32 -> 1.11 -> 0.95 today with IVF  Appreciate nephrology input  Avoid nephrotoxic agents and hypotension

## 2024-05-05 NOTE — NURSING NOTE
Personal items in suitcase removed and investigated for a pair of glasses. KELLY King and GORDO Sanchez PCA together looked in the belongings for glasses. None found.
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
## 2024-05-05 NOTE — ASSESSMENT & PLAN NOTE
With hallucinations and suicidal ideation on admission  Appreciate psychiatry input -> tentative plan for transfer to the behavioral health unit likely tomorrow once CK < 1000  Monitor on 1:1 for safety  Patient was just recently discharged from [facility] on 5/2/24  Medication list indicates he was taking Wellbutrin  mg twice daily, Nortriptyline 25 mg daily, Gabapentin 300 mg twice daily, and Ativan/Atarax as needed for anxiety

## 2024-05-05 NOTE — ASSESSMENT & PLAN NOTE
POA, sodium 125  Serum Osmo 288, urine osmole 151, urine sodium 16  Serum sodium progressively improved, initially over correcting requiring transition of fluids to D5 1/2 NS -> remains normal/stable at 140 today  Appreciate nephrology input   Encourage increased oral intake

## 2024-05-05 NOTE — ASSESSMENT & PLAN NOTE
UDS (+) for barbiturates and cocaine  Substance cessation education and counseling   Patient previously on Suboxone unable to tell me the time of his last dose - PDMP records indicate last prescription given over 6 months ago  Ativan as needed for anxiety/agitation/withdrawal

## 2024-05-06 ENCOUNTER — HOSPITAL ENCOUNTER (INPATIENT)
Facility: HOSPITAL | Age: 54
LOS: 4 days | Discharge: OTHER INSTITUTION | End: 2024-05-10
Attending: PSYCHIATRY & NEUROLOGY | Admitting: PSYCHIATRY & NEUROLOGY
Payer: MEDICARE

## 2024-05-06 VITALS
OXYGEN SATURATION: 98 % | WEIGHT: 191.8 LBS | BODY MASS INDEX: 28.41 KG/M2 | TEMPERATURE: 97.4 F | HEART RATE: 72 BPM | SYSTOLIC BLOOD PRESSURE: 135 MMHG | HEIGHT: 69 IN | RESPIRATION RATE: 20 BRPM | DIASTOLIC BLOOD PRESSURE: 74 MMHG

## 2024-05-06 DIAGNOSIS — Z79.82 CURRENT USE OF ASPIRIN: ICD-10-CM

## 2024-05-06 DIAGNOSIS — E55.9 VITAMIN D DEFICIENCY: ICD-10-CM

## 2024-05-06 DIAGNOSIS — F41.9 ANXIETY: ICD-10-CM

## 2024-05-06 DIAGNOSIS — K21.9 GERD WITHOUT ESOPHAGITIS: ICD-10-CM

## 2024-05-06 DIAGNOSIS — Z00.8 MEDICAL CLEARANCE FOR PSYCHIATRIC ADMISSION: ICD-10-CM

## 2024-05-06 DIAGNOSIS — E53.8 FOLIC ACID DEFICIENCY: ICD-10-CM

## 2024-05-06 DIAGNOSIS — F25.0 SCHIZOAFFECTIVE DISORDER, BIPOLAR TYPE (HCC): Primary | ICD-10-CM

## 2024-05-06 DIAGNOSIS — J30.2 SEASONAL ALLERGIES: ICD-10-CM

## 2024-05-06 DIAGNOSIS — M54.9 BACK PAIN: ICD-10-CM

## 2024-05-06 DIAGNOSIS — E53.8 VITAMIN B12 DEFICIENCY: ICD-10-CM

## 2024-05-06 PROBLEM — Z59.00 HOMELESSNESS: Status: ACTIVE | Noted: 2024-05-06

## 2024-05-06 LAB
25(OH)D3 SERPL-MCNC: 12.1 NG/ML (ref 30–100)
ALBUMIN SERPL BCP-MCNC: 3.5 G/DL (ref 3.5–5)
ALP SERPL-CCNC: 61 U/L (ref 34–104)
ALT SERPL W P-5'-P-CCNC: 34 U/L (ref 7–52)
ANION GAP SERPL CALCULATED.3IONS-SCNC: 8 MMOL/L (ref 4–13)
AST SERPL W P-5'-P-CCNC: 55 U/L (ref 13–39)
BASOPHILS # BLD AUTO: 0.08 THOUSANDS/ÂΜL (ref 0–0.1)
BASOPHILS NFR BLD AUTO: 1 % (ref 0–1)
BILIRUB SERPL-MCNC: 0.23 MG/DL (ref 0.2–1)
BUN SERPL-MCNC: 13 MG/DL (ref 5–25)
CALCIUM SERPL-MCNC: 8.6 MG/DL (ref 8.4–10.2)
CHLORIDE SERPL-SCNC: 106 MMOL/L (ref 96–108)
CK SERPL-CCNC: 972 U/L (ref 39–308)
CO2 SERPL-SCNC: 24 MMOL/L (ref 21–32)
CREAT SERPL-MCNC: 0.94 MG/DL (ref 0.6–1.3)
EOSINOPHIL # BLD AUTO: 0.27 THOUSAND/ÂΜL (ref 0–0.61)
EOSINOPHIL NFR BLD AUTO: 3 % (ref 0–6)
ERYTHROCYTE [DISTWIDTH] IN BLOOD BY AUTOMATED COUNT: 11.6 % (ref 11.6–15.1)
FOLATE SERPL-MCNC: 4.5 NG/ML
GFR SERPL CREATININE-BSD FRML MDRD: 92 ML/MIN/1.73SQ M
GLUCOSE SERPL-MCNC: 118 MG/DL (ref 65–140)
HCT VFR BLD AUTO: 39.7 % (ref 36.5–49.3)
HGB BLD-MCNC: 12.2 G/DL (ref 12–17)
IMM GRANULOCYTES # BLD AUTO: 0.03 THOUSAND/UL (ref 0–0.2)
IMM GRANULOCYTES NFR BLD AUTO: 0 % (ref 0–2)
LYMPHOCYTES # BLD AUTO: 2.53 THOUSANDS/ÂΜL (ref 0.6–4.47)
LYMPHOCYTES NFR BLD AUTO: 31 % (ref 14–44)
MCH RBC QN AUTO: 31 PG (ref 26.8–34.3)
MCHC RBC AUTO-ENTMCNC: 30.7 G/DL (ref 31.4–37.4)
MCV RBC AUTO: 101 FL (ref 82–98)
MONOCYTES # BLD AUTO: 0.71 THOUSAND/ÂΜL (ref 0.17–1.22)
MONOCYTES NFR BLD AUTO: 9 % (ref 4–12)
NEUTROPHILS # BLD AUTO: 4.68 THOUSANDS/ÂΜL (ref 1.85–7.62)
NEUTS SEG NFR BLD AUTO: 56 % (ref 43–75)
NRBC BLD AUTO-RTO: 0 /100 WBCS
PLATELET # BLD AUTO: 274 THOUSANDS/UL (ref 149–390)
PMV BLD AUTO: 9.4 FL (ref 8.9–12.7)
POTASSIUM SERPL-SCNC: 3.6 MMOL/L (ref 3.5–5.3)
PROT SERPL-MCNC: 6.1 G/DL (ref 6.4–8.4)
RBC # BLD AUTO: 3.93 MILLION/UL (ref 3.88–5.62)
SODIUM SERPL-SCNC: 138 MMOL/L (ref 135–147)
TSH SERPL DL<=0.05 MIU/L-ACNC: 1.84 UIU/ML (ref 0.45–4.5)
VIT B12 SERPL-MCNC: 206 PG/ML (ref 180–914)
WBC # BLD AUTO: 8.3 THOUSAND/UL (ref 4.31–10.16)

## 2024-05-06 PROCEDURE — 82746 ASSAY OF FOLIC ACID SERUM: CPT | Performed by: PSYCHIATRY & NEUROLOGY

## 2024-05-06 PROCEDURE — 99239 HOSP IP/OBS DSCHRG MGMT >30: CPT | Performed by: INTERNAL MEDICINE

## 2024-05-06 PROCEDURE — 82550 ASSAY OF CK (CPK): CPT | Performed by: INTERNAL MEDICINE

## 2024-05-06 PROCEDURE — 84443 ASSAY THYROID STIM HORMONE: CPT | Performed by: PSYCHIATRY & NEUROLOGY

## 2024-05-06 PROCEDURE — 82607 VITAMIN B-12: CPT | Performed by: PSYCHIATRY & NEUROLOGY

## 2024-05-06 PROCEDURE — 83036 HEMOGLOBIN GLYCOSYLATED A1C: CPT | Performed by: PSYCHIATRY & NEUROLOGY

## 2024-05-06 PROCEDURE — 85025 COMPLETE CBC W/AUTO DIFF WBC: CPT | Performed by: INTERNAL MEDICINE

## 2024-05-06 PROCEDURE — 80053 COMPREHEN METABOLIC PANEL: CPT | Performed by: INTERNAL MEDICINE

## 2024-05-06 PROCEDURE — 82306 VITAMIN D 25 HYDROXY: CPT | Performed by: PSYCHIATRY & NEUROLOGY

## 2024-05-06 PROCEDURE — 99253 IP/OBS CNSLTJ NEW/EST LOW 45: CPT | Performed by: NURSE PRACTITIONER

## 2024-05-06 RX ORDER — OLANZAPINE 10 MG/2ML
5 INJECTION, POWDER, FOR SOLUTION INTRAMUSCULAR
Status: CANCELLED | OUTPATIENT
Start: 2024-05-06

## 2024-05-06 RX ORDER — LORAZEPAM 1 MG/1
1 TABLET ORAL ONCE
Status: COMPLETED | OUTPATIENT
Start: 2024-05-06 | End: 2024-05-06

## 2024-05-06 RX ORDER — HYDROXYZINE HYDROCHLORIDE 25 MG/1
25 TABLET, FILM COATED ORAL
Status: CANCELLED | OUTPATIENT
Start: 2024-05-06

## 2024-05-06 RX ORDER — CHLORHEXIDINE GLUCONATE ORAL RINSE 1.2 MG/ML
15 SOLUTION DENTAL EVERY 12 HOURS SCHEDULED
Status: DISCONTINUED | OUTPATIENT
Start: 2024-05-06 | End: 2024-05-10 | Stop reason: HOSPADM

## 2024-05-06 RX ORDER — BISACODYL 10 MG
10 SUPPOSITORY, RECTAL RECTAL DAILY PRN
Status: CANCELLED | OUTPATIENT
Start: 2024-05-06

## 2024-05-06 RX ORDER — CHLORHEXIDINE GLUCONATE ORAL RINSE 1.2 MG/ML
15 SOLUTION DENTAL EVERY 12 HOURS SCHEDULED
Status: CANCELLED | OUTPATIENT
Start: 2024-05-06

## 2024-05-06 RX ORDER — PROPRANOLOL HYDROCHLORIDE 10 MG/1
5 TABLET ORAL EVERY 8 HOURS PRN
Status: CANCELLED | OUTPATIENT
Start: 2024-05-06

## 2024-05-06 RX ORDER — IBUPROFEN 400 MG/1
400 TABLET ORAL EVERY 6 HOURS PRN
Status: DISCONTINUED | OUTPATIENT
Start: 2024-05-06 | End: 2024-05-10 | Stop reason: HOSPADM

## 2024-05-06 RX ORDER — HYDROXYZINE HYDROCHLORIDE 25 MG/1
25 TABLET, FILM COATED ORAL
Status: DISCONTINUED | OUTPATIENT
Start: 2024-05-06 | End: 2024-05-10 | Stop reason: HOSPADM

## 2024-05-06 RX ORDER — LORAZEPAM 2 MG/ML
1 INJECTION INTRAMUSCULAR
Status: CANCELLED | OUTPATIENT
Start: 2024-05-06

## 2024-05-06 RX ORDER — NORTRIPTYLINE HYDROCHLORIDE 25 MG/1
25 CAPSULE ORAL DAILY
Status: DISCONTINUED | OUTPATIENT
Start: 2024-05-07 | End: 2024-05-10 | Stop reason: HOSPADM

## 2024-05-06 RX ORDER — IBUPROFEN 200 MG
200 TABLET ORAL EVERY 6 HOURS PRN
Status: DISCONTINUED | OUTPATIENT
Start: 2024-05-06 | End: 2024-05-10 | Stop reason: HOSPADM

## 2024-05-06 RX ORDER — OLANZAPINE 10 MG/1
10 TABLET ORAL
Status: CANCELLED | OUTPATIENT
Start: 2024-05-06

## 2024-05-06 RX ORDER — NORTRIPTYLINE HYDROCHLORIDE 25 MG/1
25 CAPSULE ORAL DAILY
Status: CANCELLED | OUTPATIENT
Start: 2024-05-07

## 2024-05-06 RX ORDER — BENZTROPINE MESYLATE 1 MG/ML
1 INJECTION INTRAMUSCULAR; INTRAVENOUS
Status: CANCELLED | OUTPATIENT
Start: 2024-05-06

## 2024-05-06 RX ORDER — OLANZAPINE 5 MG/1
5 TABLET ORAL
Status: DISCONTINUED | OUTPATIENT
Start: 2024-05-06 | End: 2024-05-10 | Stop reason: HOSPADM

## 2024-05-06 RX ORDER — OLANZAPINE 5 MG/1
5 TABLET ORAL
Status: CANCELLED | OUTPATIENT
Start: 2024-05-06

## 2024-05-06 RX ORDER — PROPRANOLOL HYDROCHLORIDE 10 MG/1
5 TABLET ORAL EVERY 8 HOURS PRN
Status: DISCONTINUED | OUTPATIENT
Start: 2024-05-06 | End: 2024-05-10 | Stop reason: HOSPADM

## 2024-05-06 RX ORDER — MAGNESIUM HYDROXIDE/ALUMINUM HYDROXICE/SIMETHICONE 120; 1200; 1200 MG/30ML; MG/30ML; MG/30ML
30 SUSPENSION ORAL EVERY 4 HOURS PRN
Status: CANCELLED | OUTPATIENT
Start: 2024-05-06

## 2024-05-06 RX ORDER — BENZTROPINE MESYLATE 1 MG/ML
1 INJECTION INTRAMUSCULAR; INTRAVENOUS
Status: DISCONTINUED | OUTPATIENT
Start: 2024-05-06 | End: 2024-05-10 | Stop reason: HOSPADM

## 2024-05-06 RX ORDER — BENZTROPINE MESYLATE 0.5 MG/1
0.5 TABLET ORAL
Status: DISCONTINUED | OUTPATIENT
Start: 2024-05-06 | End: 2024-05-10 | Stop reason: HOSPADM

## 2024-05-06 RX ORDER — AMOXICILLIN 250 MG
1 CAPSULE ORAL DAILY PRN
Status: CANCELLED | OUTPATIENT
Start: 2024-05-06

## 2024-05-06 RX ORDER — FLUTICASONE PROPIONATE 50 MCG
2 SPRAY, SUSPENSION (ML) NASAL DAILY
Status: CANCELLED | OUTPATIENT
Start: 2024-05-07

## 2024-05-06 RX ORDER — AMOXICILLIN 250 MG
1 CAPSULE ORAL DAILY PRN
Status: DISCONTINUED | OUTPATIENT
Start: 2024-05-06 | End: 2024-05-10 | Stop reason: HOSPADM

## 2024-05-06 RX ORDER — OLANZAPINE 2.5 MG/1
2.5 TABLET, FILM COATED ORAL
Status: DISCONTINUED | OUTPATIENT
Start: 2024-05-06 | End: 2024-05-10 | Stop reason: HOSPADM

## 2024-05-06 RX ORDER — MAGNESIUM HYDROXIDE/ALUMINUM HYDROXICE/SIMETHICONE 120; 1200; 1200 MG/30ML; MG/30ML; MG/30ML
30 SUSPENSION ORAL EVERY 4 HOURS PRN
Status: DISCONTINUED | OUTPATIENT
Start: 2024-05-06 | End: 2024-05-10 | Stop reason: HOSPADM

## 2024-05-06 RX ORDER — BISACODYL 10 MG
10 SUPPOSITORY, RECTAL RECTAL DAILY PRN
Status: DISCONTINUED | OUTPATIENT
Start: 2024-05-06 | End: 2024-05-10 | Stop reason: HOSPADM

## 2024-05-06 RX ORDER — PANTOPRAZOLE SODIUM 40 MG/1
40 TABLET, DELAYED RELEASE ORAL
Status: DISCONTINUED | OUTPATIENT
Start: 2024-05-07 | End: 2024-05-10 | Stop reason: HOSPADM

## 2024-05-06 RX ORDER — BENZTROPINE MESYLATE 1 MG/1
0.5 TABLET ORAL
Status: CANCELLED | OUTPATIENT
Start: 2024-05-06

## 2024-05-06 RX ORDER — GABAPENTIN 300 MG/1
300 CAPSULE ORAL 2 TIMES DAILY
Status: CANCELLED | OUTPATIENT
Start: 2024-05-06

## 2024-05-06 RX ORDER — HYDROXYZINE 50 MG/1
50 TABLET, FILM COATED ORAL
Status: DISCONTINUED | OUTPATIENT
Start: 2024-05-06 | End: 2024-05-10 | Stop reason: HOSPADM

## 2024-05-06 RX ORDER — OLANZAPINE 10 MG/1
10 TABLET ORAL
Status: DISCONTINUED | OUTPATIENT
Start: 2024-05-06 | End: 2024-05-08

## 2024-05-06 RX ORDER — LANOLIN ALCOHOL/MO/W.PET/CERES
3 CREAM (GRAM) TOPICAL
Status: DISCONTINUED | OUTPATIENT
Start: 2024-05-06 | End: 2024-05-10 | Stop reason: HOSPADM

## 2024-05-06 RX ORDER — PANTOPRAZOLE SODIUM 40 MG/1
40 TABLET, DELAYED RELEASE ORAL
Status: CANCELLED | OUTPATIENT
Start: 2024-05-07

## 2024-05-06 RX ORDER — IBUPROFEN 400 MG/1
200 TABLET ORAL EVERY 6 HOURS PRN
Status: CANCELLED | OUTPATIENT
Start: 2024-05-06

## 2024-05-06 RX ORDER — POLYETHYLENE GLYCOL 3350 17 G/17G
17 POWDER, FOR SOLUTION ORAL DAILY PRN
Status: DISCONTINUED | OUTPATIENT
Start: 2024-05-06 | End: 2024-05-10 | Stop reason: HOSPADM

## 2024-05-06 RX ORDER — TRAZODONE HYDROCHLORIDE 50 MG/1
50 TABLET ORAL
Status: DISCONTINUED | OUTPATIENT
Start: 2024-05-06 | End: 2024-05-08

## 2024-05-06 RX ORDER — IBUPROFEN 600 MG/1
600 TABLET ORAL EVERY 8 HOURS PRN
Status: CANCELLED | OUTPATIENT
Start: 2024-05-06

## 2024-05-06 RX ORDER — LANOLIN ALCOHOL/MO/W.PET/CERES
6 CREAM (GRAM) TOPICAL
Status: DISCONTINUED | OUTPATIENT
Start: 2024-05-06 | End: 2024-05-10 | Stop reason: HOSPADM

## 2024-05-06 RX ORDER — LANOLIN ALCOHOL/MO/W.PET/CERES
3 CREAM (GRAM) TOPICAL
Status: CANCELLED | OUTPATIENT
Start: 2024-05-06

## 2024-05-06 RX ORDER — BUPROPION HYDROCHLORIDE 150 MG/1
150 TABLET ORAL 2 TIMES DAILY
Status: DISCONTINUED | OUTPATIENT
Start: 2024-05-06 | End: 2024-05-08

## 2024-05-06 RX ORDER — GABAPENTIN 300 MG/1
300 CAPSULE ORAL 2 TIMES DAILY
Status: DISCONTINUED | OUTPATIENT
Start: 2024-05-06 | End: 2024-05-10 | Stop reason: HOSPADM

## 2024-05-06 RX ORDER — BUPROPION HYDROCHLORIDE 150 MG/1
150 TABLET ORAL 2 TIMES DAILY
Status: CANCELLED | OUTPATIENT
Start: 2024-05-06

## 2024-05-06 RX ORDER — FLUTICASONE PROPIONATE 50 MCG
2 SPRAY, SUSPENSION (ML) NASAL DAILY
Status: DISCONTINUED | OUTPATIENT
Start: 2024-05-07 | End: 2024-05-10 | Stop reason: HOSPADM

## 2024-05-06 RX ORDER — TRAZODONE HYDROCHLORIDE 50 MG/1
50 TABLET ORAL
Status: CANCELLED | OUTPATIENT
Start: 2024-05-06

## 2024-05-06 RX ORDER — LANOLIN ALCOHOL/MO/W.PET/CERES
6 CREAM (GRAM) TOPICAL
Status: CANCELLED | OUTPATIENT
Start: 2024-05-06

## 2024-05-06 RX ORDER — IBUPROFEN 600 MG/1
600 TABLET ORAL EVERY 8 HOURS PRN
Status: DISCONTINUED | OUTPATIENT
Start: 2024-05-06 | End: 2024-05-10 | Stop reason: HOSPADM

## 2024-05-06 RX ORDER — LORAZEPAM 2 MG/ML
1 INJECTION INTRAMUSCULAR
Status: DISCONTINUED | OUTPATIENT
Start: 2024-05-06 | End: 2024-05-10 | Stop reason: HOSPADM

## 2024-05-06 RX ORDER — IBUPROFEN 400 MG/1
400 TABLET ORAL EVERY 6 HOURS PRN
Status: CANCELLED | OUTPATIENT
Start: 2024-05-06

## 2024-05-06 RX ORDER — OLANZAPINE 2.5 MG/1
2.5 TABLET, FILM COATED ORAL
Status: CANCELLED | OUTPATIENT
Start: 2024-05-06

## 2024-05-06 RX ORDER — BISACODYL 10 MG
10 SUPPOSITORY, RECTAL RECTAL DAILY PRN
Status: DISCONTINUED | OUTPATIENT
Start: 2024-05-06 | End: 2024-05-06

## 2024-05-06 RX ORDER — HYDROXYZINE 50 MG/1
50 TABLET, FILM COATED ORAL
Status: CANCELLED | OUTPATIENT
Start: 2024-05-06

## 2024-05-06 RX ORDER — POLYETHYLENE GLYCOL 3350 17 G/17G
17 POWDER, FOR SOLUTION ORAL DAILY PRN
Status: CANCELLED | OUTPATIENT
Start: 2024-05-06

## 2024-05-06 RX ORDER — OLANZAPINE 10 MG/2ML
5 INJECTION, POWDER, FOR SOLUTION INTRAMUSCULAR
Status: DISCONTINUED | OUTPATIENT
Start: 2024-05-06 | End: 2024-05-10 | Stop reason: HOSPADM

## 2024-05-06 RX ADMIN — LORAZEPAM 1 MG: 1 TABLET ORAL at 22:21

## 2024-05-06 RX ADMIN — BUPROPION HYDROCHLORIDE 150 MG: 150 TABLET, EXTENDED RELEASE ORAL at 09:24

## 2024-05-06 RX ADMIN — CHLORHEXIDINE GLUCONATE 0.12% ORAL RINSE 15 ML: 1.2 LIQUID ORAL at 21:00

## 2024-05-06 RX ADMIN — CHLORHEXIDINE GLUCONATE 0.12% ORAL RINSE 15 ML: 1.2 LIQUID ORAL at 09:24

## 2024-05-06 RX ADMIN — ASPIRIN 81 MG: 81 TABLET, COATED ORAL at 09:24

## 2024-05-06 RX ADMIN — HYDROXYZINE HYDROCHLORIDE 50 MG: 50 TABLET, FILM COATED ORAL at 19:36

## 2024-05-06 RX ADMIN — ACETAMINOPHEN 650 MG: 325 TABLET ORAL at 05:48

## 2024-05-06 RX ADMIN — NORTRIPTYLINE HYDROCHLORIDE 25 MG: 25 CAPSULE ORAL at 09:01

## 2024-05-06 RX ADMIN — GABAPENTIN 300 MG: 300 CAPSULE ORAL at 09:24

## 2024-05-06 RX ADMIN — GABAPENTIN 300 MG: 300 CAPSULE ORAL at 18:25

## 2024-05-06 RX ADMIN — ALUMINUM HYDROXIDE, MAGNESIUM HYDROXIDE, AND DIMETHICONE 30 ML: 200; 20; 200 SUSPENSION ORAL at 20:45

## 2024-05-06 RX ADMIN — MELATONIN TAB 3 MG 6 MG: 3 TAB at 21:01

## 2024-05-06 RX ADMIN — FLUTICASONE PROPIONATE 2 SPRAY: 50 SPRAY, METERED NASAL at 12:02

## 2024-05-06 RX ADMIN — LORAZEPAM 2 MG: 1 TABLET ORAL at 09:26

## 2024-05-06 RX ADMIN — DEXTROSE AND SODIUM CHLORIDE 125 ML/HR: 5; .45 INJECTION, SOLUTION INTRAVENOUS at 05:48

## 2024-05-06 RX ADMIN — OLANZAPINE 10 MG: 10 TABLET, FILM COATED ORAL at 21:01

## 2024-05-06 RX ADMIN — PANTOPRAZOLE SODIUM 40 MG: 40 TABLET, DELAYED RELEASE ORAL at 05:48

## 2024-05-06 RX ADMIN — BUPROPION HYDROCHLORIDE 150 MG: 150 TABLET, FILM COATED, EXTENDED RELEASE ORAL at 21:00

## 2024-05-06 NOTE — NURSING NOTE
Report for Matthew was called to [facility] on [address]. He has all his belongings from this unit. He does have belongings in the pharmacy and with Security. The forms for those will go with him to the new unit.  Will transfer with PCA and Security. Will continue to monitor until transferred.

## 2024-05-06 NOTE — CASE MANAGEMENT
Case Management Progress Note    Patient name Matthew Carlson  Location 7T U 703/7T U 703-01 MRN [id number]  : 1970 Date 2024       LOS (days): 3  Geometric Mean LOS (GMLOS) (days): 3.1  Days to GMLOS:0.2        OBJECTIVE:        Current admission status: Inpatient  Preferred Pharmacy:   [facility] [place] [place] - [address]   [address] [facility] [address]  Phone: [phone] Fax: [phone]    [facility], [address] [place], [place]  [address] [place] [address]  Phone: [phone] Fax: [phone]    Primary Care Provider: No primary care provider on file.    Primary Insurance: [id number] MEDICARE [facility]  Secondary Insurance: [place] [employer] [facility]    PROGRESS NOTE:    CM from [facility] and need to get insurance auth for BHU from [employer].    Call to [employer] talked to Mele ([phone]) and request needs to be faxed.  Behavior Health Auth Request Form completed and faxed to [phone].

## 2024-05-06 NOTE — CASE MANAGEMENT
Case Management Assessment & Discharge Planning Note    Patient name Matthew Carlson  Location 7T [facility] 703/7T [facility] 703-01 MRN [id number]  : 1970 Date 2024       Current Admission Date: 5/3/2024  Current Admission Diagnosis:BREEZY (acute kidney injury) (HCC)   Patient Active Problem List    Diagnosis Date Noted    Hypokalemia 2024    BREEZY (acute kidney injury) (HCC) 2024    Hyponatremia 2024    Rhabdomyolysis 2024    Bilateral hand pain 2024    Schizoaffective disorder (HCC) 2024    Opioid abuse ([facility]) 2024      LOS (days): 3  Geometric Mean LOS (GMLOS) (days): 3.1  Days to GMLOS:0.3     OBJECTIVE:    Risk of Unplanned Readmission Score: 20.72      Current admission status: Inpatient  Referral Reason: Homeless    Preferred Pharmacy:   [facility] [address] [facility]  [address] [facility] [address]  Phone: [phone] Fax: [phone]    [facility], [address] [place], [place]  [address] [place] [address]  Phone: [phone] Fax: [phone]    Primary Care Provider: No primary care provider on file.    Primary Insurance: [employer] MEDICARE [facility]  Secondary Insurance: [place] [employer] [place] [facility]    ASSESSMENT:  Active Health Care Proxies    There are no active Health Care Proxies on file.     Readmission Root Cause  30 Day Readmission: No    Patient Information  Admitted from:: Home  Mental Status: Alert  During Assessment patient was accompanied by: Not accompanied during assessment  Assessment information provided by:: Patient  Primary Caregiver: Self  Support Systems: Self  County of Residence: [place]  What [facility] do you live in?: [place]  Type of Current Residence: Homeless  Living Arrangements: Lives Alone  Is patient a ?: No    Activities of Daily Living Prior to Admission  Functional Status: Independent  Completes ADLs independently?: Yes  Ambulates independently?:  Yes  Does patient use assisted devices?: No  Does patient currently own DME?: No  Does patient have a history of Outpatient Therapy (PT/OT)?: No  Does the patient have a history of Short-Term Rehab?: No  Does patient have a history of HHC?: No  Does patient currently have HHC?: No         Patient Information Continued  Income Source: SSI/SSD  Does patient have prescription coverage?: Yes  Does patient receive dialysis treatments?: No  Does patient have a history of substance abuse?: Yes  Historical substance use preference: Cocaine, Barbituates  History of Withdrawal Symptoms: Denies past symptoms  Is patient currently in treatment for substance abuse?: Yes  Current Status:: 201  Does patient have a history of Mental Health Diagnosis?: Yes  Is patient receiving treatment for mental health?: Yes  Has patient received inpatient treatment related to mental health in the last 2 years?: Yes (3/29/22, 5/4/23, 8/5/23)    PHQ 2/9 Screening   Reviewed PHQ 2/9 Depression Screening Score?: No    Means of Transportation  Means of Transport to Appts:: None      Social Determinants of Health (SDOH)      Flowsheet Row Most Recent Value   Housing Stability    In the last 12 months, was there a time when you were not able to pay the mortgage or rent on time? N   In the last 12 months, how many places have you lived? 2   In the last 12 months, was there a time when you did not have a steady place to sleep or slept in a shelter (including now)? Y   Transportation Needs    In the past 12 months, has lack of transportation kept you from medical appointments or from getting medications? yes   In the past 12 months, has lack of transportation kept you from meetings, work, or from getting things needed for daily living? Yes   Food Insecurity    Within the past 12 months, you worried that your food would run out before you got the money to buy more. Sometimes   Within the past 12 months, the food you bought just didn't last and you didn't  have money to get more. Sometimes   Utilities    In the past 12 months has the electric, gas, oil, or water company threatened to shut off services in your home? No            DISCHARGE DETAILS:    Discharge planning discussed with:: Patient  Freedom of Choice: Yes     CM contacted family/caregiver?: No- see comments       Other Referral/Resources/Interventions Provided:  Interventions: Inpatient Behavioral Health       Additional Comments: Met with patient at bedside.  Patient signed 201 with psych.  Patient is medically cleared per STEPHANIE Marr.  201 faxed to crisis intake [phone] and emailed to Yakelin.  FUAD department following thru discharge

## 2024-05-06 NOTE — PROGRESS NOTES
Patient is medically stable for discharge to inpatient behavioral health unit now that CK level is less than 1000.
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
## 2024-05-06 NOTE — ASSESSMENT & PLAN NOTE
Vital signs stable afebrile patient seen and examined by me labs from 5/6/2024 were reviewed by myself sodium 138 potassium 3.6 creatinine 0.94  Patient medically stable for admit  SL IM will sign off please call with any questions or concerns

## 2024-05-06 NOTE — TREATMENT TEAM
05/06/24 1700   Provider Notification   Reason for Communication Admission  (PTA meds unable to be verified by pharmacy)   Provider Name Dr. Arenas   Provider Role Attending physician   Method of Communication Other (Comment)  (tiger text)   Notification Time 1719     Pt reports medication noncompliance for past 2 weeks, poor historian.
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
## 2024-05-06 NOTE — TREATMENT TEAM
05/06/24 1936   Faulkner Anxiety Scale   Anxious Mood 3   Tension 4   Fears 3   Insomnia 0   Intellectual 2   Depressed Mood 3   Somatic Complaints: Muscular 0   Somatic Complaints: Sensory 0   Cardiovascular Symptoms 0   Respiratory Symptoms 0   Gastrointestinal Symptoms 0   Genitourinary Symptoms 0   Autonomic Symptoms 0   Behavior at Interview 3   Faulkner Anxiety Score 18     Provided with prn atarax for anxiety

## 2024-05-06 NOTE — NURSING NOTE
"Patient requested IV removal, as, \"Since I am medically cleared, can you take out the IV?\" I stated that I would clear it with Dr. Marr first. When I spoke with the doctor and gained approval, I returned to the room. Matthew stated, \"I removed it myself.\" No bleeding noted. Will continue to monitor.   "

## 2024-05-06 NOTE — ASSESSMENT & PLAN NOTE
With hallucinations and suicidal ideation on admission  Appreciate psychiatry input -> tentative plan for transfer to the behavioral health unit today now that CK level less than 1000  Monitor on 1:1 for safety  Patient was just recently discharged from [facility] on 5/2/24  Medication list indicates he was taking Wellbutrin  mg twice daily, Nortriptyline 25 mg daily, Gabapentin 300 mg twice daily, and Ativan/Atarax as needed for anxiety
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
## 2024-05-06 NOTE — CASE MANAGEMENT
Case Management Progress Note    Patient name Matthew Carlson  Location 7T U 703/7T U 703-01 MRN [id number]  : 1970 Date 2024       LOS (days): 3  Geometric Mean LOS (GMLOS) (days): 3.1  Days to GMLOS:0.2        OBJECTIVE:        Current admission status: Inpatient  Preferred Pharmacy:   [facility] [place], [place] - [address] [facility] [address]  Phone: [phone] Fax: [phone]    [facility], [address] [place], [place]  [address] [place] [address]  Phone: [phone] Fax: [phone]    Primary Care Provider: No primary care provider on file.    Primary Insurance: [employer]  REP  Secondary Insurance: [place] [employer] AND [employer] [facility]    PROGRESS NOTE:    Call to [facility] for insurance auth for IP [facility] talked to [employer].  [employer] given info and CM will get CB to complete auth.    Met with patient at bedside and updated that bed is available at [facility] 6[facility].

## 2024-05-06 NOTE — UTILIZATION REVIEW
NOTIFICATION OF INPATIENT MEDICAL ADMISSION   AUTHORIZATION REQUEST   SERVICING FACILITY:   [address] [address]  Tax ID: [id number]  NPI: [id number] ATTENDING PROVIDER:  Attending Name and NPI#: Mariano Marr Do [[id number]]  Address: [address] [place], [place] [address]  Phone: [phone]     ADMISSION INFORMATION:  Place of Service: Inpatient [facility]  Place of Service Code: 21  Inpatient Admission Date/Time: 5/3/24  2:05 PM  Discharge Date/Time: No discharge date for patient encounter.  Admitting Diagnosis Code/Description:  Suicidal ideation [R45.851]  Auditory hallucination [R44.0]  Hyponatremia [E87.1]  Leukocytosis [D72.829]  BREEZY (acute kidney injury) (HCC) [N17.9]  Encounter for psychological evaluation [Z00.8]     UTILIZATION REVIEW CONTACT:  Renetta Garcia, Utilization   Network Utilization Review Department  Phone: [phone]  Fax [phone]  Email: [email or web address]  Contact for approvals/pending authorizations, clinical reviews, and discharge.     PHYSICIAN ADVISORY SERVICES:  Medical Necessity Denial & Osfs-as-Vojt Review  Phone: [phone]  Fax: [phone]  Email: [email or web address]     DISCHARGE SUPPORT TEAM:  For Patients Discharge Needs & Updates  Phone: [phone] opt. 2 Fax: [phone]  Email: [email or web address]

## 2024-05-06 NOTE — ASSESSMENT & PLAN NOTE
Patient is homeless  Patient's primary will be psychiatry and the  will be in charge of this issue

## 2024-05-07 PROBLEM — F25.0 SCHIZOAFFECTIVE DISORDER, BIPOLAR TYPE (HCC): Status: ACTIVE | Noted: 2024-05-03

## 2024-05-07 PROBLEM — E53.8 FOLIC ACID DEFICIENCY: Status: ACTIVE | Noted: 2024-05-07

## 2024-05-07 PROBLEM — M79.89 SWOLLEN ARM: Status: ACTIVE | Noted: 2024-05-07

## 2024-05-07 PROBLEM — E53.8 VITAMIN B12 DEFICIENCY: Status: ACTIVE | Noted: 2024-05-07

## 2024-05-07 PROBLEM — E55.9 VITAMIN D DEFICIENCY: Status: ACTIVE | Noted: 2024-05-07

## 2024-05-07 LAB
ANION GAP SERPL CALCULATED.3IONS-SCNC: 11 MMOL/L (ref 4–13)
ATRIAL RATE: 81 BPM
BASOPHILS # BLD AUTO: 0.1 THOUSANDS/ÂΜL (ref 0–0.1)
BASOPHILS NFR BLD AUTO: 2 % (ref 0–1)
BUN SERPL-MCNC: 17 MG/DL (ref 5–25)
CALCIUM SERPL-MCNC: 9.2 MG/DL (ref 8.4–10.2)
CHLORIDE SERPL-SCNC: 103 MMOL/L (ref 96–108)
CHOLEST SERPL-MCNC: 204 MG/DL
CK SERPL-CCNC: 351 U/L (ref 39–308)
CO2 SERPL-SCNC: 23 MMOL/L (ref 21–32)
CREAT SERPL-MCNC: 0.93 MG/DL (ref 0.6–1.3)
EOSINOPHIL # BLD AUTO: 0.3 THOUSAND/ÂΜL (ref 0–0.61)
EOSINOPHIL NFR BLD AUTO: 5 % (ref 0–6)
ERYTHROCYTE [DISTWIDTH] IN BLOOD BY AUTOMATED COUNT: 11.7 % (ref 11.6–15.1)
EST. AVERAGE GLUCOSE BLD GHB EST-MCNC: 114 MG/DL
GFR SERPL CREATININE-BSD FRML MDRD: 94 ML/MIN/1.73SQ M
GLUCOSE SERPL-MCNC: 148 MG/DL (ref 65–140)
HBA1C MFR BLD: 5.6 %
HCT VFR BLD AUTO: 42.3 % (ref 36.5–49.3)
HDLC SERPL-MCNC: 45 MG/DL
HGB BLD-MCNC: 13.1 G/DL (ref 12–17)
IMM GRANULOCYTES # BLD AUTO: 0.02 THOUSAND/UL (ref 0–0.2)
IMM GRANULOCYTES NFR BLD AUTO: 0 % (ref 0–2)
LDLC SERPL CALC-MCNC: 90 MG/DL (ref 0–100)
LYMPHOCYTES # BLD AUTO: 2.34 THOUSANDS/ÂΜL (ref 0.6–4.47)
LYMPHOCYTES NFR BLD AUTO: 37 % (ref 14–44)
MAGNESIUM SERPL-MCNC: 1.3 MG/DL (ref 1.9–2.7)
MCH RBC QN AUTO: 32.1 PG (ref 26.8–34.3)
MCHC RBC AUTO-ENTMCNC: 31 G/DL (ref 31.4–37.4)
MCV RBC AUTO: 104 FL (ref 82–98)
MONOCYTES # BLD AUTO: 0.58 THOUSAND/ÂΜL (ref 0.17–1.22)
MONOCYTES NFR BLD AUTO: 9 % (ref 4–12)
NEUTROPHILS # BLD AUTO: 2.99 THOUSANDS/ÂΜL (ref 1.85–7.62)
NEUTS SEG NFR BLD AUTO: 47 % (ref 43–75)
NONHDLC SERPL-MCNC: 159 MG/DL
NRBC BLD AUTO-RTO: 0 /100 WBCS
P AXIS: 19 DEGREES
PHOSPHATE SERPL-MCNC: 2.6 MG/DL (ref 2.7–4.5)
PLATELET # BLD AUTO: 289 THOUSANDS/UL (ref 149–390)
PMV BLD AUTO: 9.8 FL (ref 8.9–12.7)
POTASSIUM SERPL-SCNC: 3.7 MMOL/L (ref 3.5–5.3)
PR INTERVAL: 156 MS
QRS AXIS: 18 DEGREES
QRSD INTERVAL: 88 MS
QT INTERVAL: 362 MS
QTC INTERVAL: 420 MS
RBC # BLD AUTO: 4.08 MILLION/UL (ref 3.88–5.62)
SODIUM SERPL-SCNC: 137 MMOL/L (ref 135–147)
T WAVE AXIS: 33 DEGREES
TRIGL SERPL-MCNC: 345 MG/DL
VENTRICULAR RATE: 81 BPM
WBC # BLD AUTO: 6.33 THOUSAND/UL (ref 4.31–10.16)

## 2024-05-07 PROCEDURE — 93005 ELECTROCARDIOGRAM TRACING: CPT

## 2024-05-07 PROCEDURE — 82550 ASSAY OF CK (CPK): CPT | Performed by: PSYCHIATRY & NEUROLOGY

## 2024-05-07 PROCEDURE — 80061 LIPID PANEL: CPT | Performed by: PSYCHIATRY & NEUROLOGY

## 2024-05-07 PROCEDURE — 80048 BASIC METABOLIC PNL TOTAL CA: CPT | Performed by: PSYCHIATRY & NEUROLOGY

## 2024-05-07 PROCEDURE — 85025 COMPLETE CBC W/AUTO DIFF WBC: CPT | Performed by: PSYCHIATRY & NEUROLOGY

## 2024-05-07 PROCEDURE — 99223 1ST HOSP IP/OBS HIGH 75: CPT | Performed by: STUDENT IN AN ORGANIZED HEALTH CARE EDUCATION/TRAINING PROGRAM

## 2024-05-07 PROCEDURE — 84100 ASSAY OF PHOSPHORUS: CPT | Performed by: PSYCHIATRY & NEUROLOGY

## 2024-05-07 PROCEDURE — 93010 ELECTROCARDIOGRAM REPORT: CPT | Performed by: INTERNAL MEDICINE

## 2024-05-07 PROCEDURE — 83735 ASSAY OF MAGNESIUM: CPT | Performed by: PSYCHIATRY & NEUROLOGY

## 2024-05-07 RX ORDER — LANOLIN ALCOHOL/MO/W.PET/CERES
800 CREAM (GRAM) TOPICAL 2 TIMES DAILY
Status: COMPLETED | OUTPATIENT
Start: 2024-05-07 | End: 2024-05-09

## 2024-05-07 RX ORDER — LORAZEPAM 0.5 MG/1
0.5 TABLET ORAL EVERY 8 HOURS PRN
Status: DISCONTINUED | OUTPATIENT
Start: 2024-05-07 | End: 2024-05-09

## 2024-05-07 RX ORDER — FOLIC ACID 1 MG/1
1 TABLET ORAL DAILY
Status: DISCONTINUED | OUTPATIENT
Start: 2024-05-07 | End: 2024-05-10 | Stop reason: HOSPADM

## 2024-05-07 RX ORDER — ERGOCALCIFEROL 1.25 MG/1
50000 CAPSULE ORAL WEEKLY
Status: DISCONTINUED | OUTPATIENT
Start: 2024-05-07 | End: 2024-05-10 | Stop reason: HOSPADM

## 2024-05-07 RX ORDER — LORAZEPAM 0.5 MG/1
0.5 TABLET ORAL EVERY 6 HOURS
Status: DISCONTINUED | OUTPATIENT
Start: 2024-05-07 | End: 2024-05-07

## 2024-05-07 RX ORDER — LORAZEPAM 0.5 MG/1
0.5 TABLET ORAL EVERY 12 HOURS
Status: DISCONTINUED | OUTPATIENT
Start: 2024-05-09 | End: 2024-05-07

## 2024-05-07 RX ORDER — LORAZEPAM 0.5 MG/1
0.5 TABLET ORAL EVERY 24 HOURS
Status: DISCONTINUED | OUTPATIENT
Start: 2024-05-11 | End: 2024-05-07

## 2024-05-07 RX ORDER — LIDOCAINE 50 MG/G
2 PATCH TOPICAL DAILY
Status: DISCONTINUED | OUTPATIENT
Start: 2024-05-07 | End: 2024-05-10 | Stop reason: HOSPADM

## 2024-05-07 RX ORDER — LORAZEPAM 0.5 MG/1
0.5 TABLET ORAL EVERY 8 HOURS
Status: DISCONTINUED | OUTPATIENT
Start: 2024-05-08 | End: 2024-05-07

## 2024-05-07 RX ORDER — CYANOCOBALAMIN 1000 UG/ML
1000 INJECTION, SOLUTION INTRAMUSCULAR; SUBCUTANEOUS ONCE
Status: DISCONTINUED | OUTPATIENT
Start: 2024-05-07 | End: 2024-05-10 | Stop reason: HOSPADM

## 2024-05-07 RX ADMIN — MELATONIN TAB 3 MG 6 MG: 3 TAB at 21:07

## 2024-05-07 RX ADMIN — LORAZEPAM 0.5 MG: 0.5 TABLET ORAL at 23:42

## 2024-05-07 RX ADMIN — GABAPENTIN 300 MG: 300 CAPSULE ORAL at 17:05

## 2024-05-07 RX ADMIN — DIBASIC SODIUM PHOSPHATE, MONOBASIC POTASSIUM PHOSPHATE AND MONOBASIC SODIUM PHOSPHATE 1 TABLET: 852; 155; 130 TABLET ORAL at 17:05

## 2024-05-07 RX ADMIN — OLANZAPINE 10 MG: 10 TABLET, FILM COATED ORAL at 21:07

## 2024-05-07 RX ADMIN — BUPROPION HYDROCHLORIDE 150 MG: 150 TABLET, FILM COATED, EXTENDED RELEASE ORAL at 08:52

## 2024-05-07 RX ADMIN — CHLORHEXIDINE GLUCONATE 0.12% ORAL RINSE 15 ML: 1.2 LIQUID ORAL at 08:53

## 2024-05-07 RX ADMIN — Medication 800 MG: at 17:05

## 2024-05-07 RX ADMIN — LORAZEPAM 0.5 MG: 0.5 TABLET ORAL at 15:28

## 2024-05-07 RX ADMIN — ASPIRIN 81 MG: 81 TABLET, COATED ORAL at 08:52

## 2024-05-07 RX ADMIN — GABAPENTIN 300 MG: 300 CAPSULE ORAL at 08:52

## 2024-05-07 RX ADMIN — FLUTICASONE PROPIONATE 2 SPRAY: 50 SPRAY, METERED NASAL at 08:53

## 2024-05-07 RX ADMIN — LIDOCAINE 2 PATCH: 700 PATCH TOPICAL at 12:36

## 2024-05-07 RX ADMIN — CHLORHEXIDINE GLUCONATE 0.12% ORAL RINSE 15 ML: 1.2 LIQUID ORAL at 21:08

## 2024-05-07 RX ADMIN — NORTRIPTYLINE HYDROCHLORIDE 25 MG: 25 CAPSULE ORAL at 08:52

## 2024-05-07 RX ADMIN — BUPROPION HYDROCHLORIDE 150 MG: 150 TABLET, FILM COATED, EXTENDED RELEASE ORAL at 21:07

## 2024-05-07 NOTE — NURSING NOTE
"Pt withdrawn to room this evening, resting intermittently. Currently endorses anxiety, depression, and SI no plan. Able to contract for safety. Pt reports \"everything\" makes him anxious. Last BM 5/6. Will maintain q7min checks.  "

## 2024-05-07 NOTE — PROGRESS NOTES
05/07/24 1000 05/07/24 1100 05/07/24 1300   Activity/Group Checklist   Group Community meeting Other (Comment)  ( Recovery: healthy lifestyles and wellness) Pet therapy   Attendance Did not attend Did not attend Did not attend

## 2024-05-07 NOTE — ASSESSMENT & PLAN NOTE
R hand swollen  Appears to be from old healing IV site  No need for abx, no redness, no drainage, no abnormal warmth  R hand x-rays all reviewed and normal

## 2024-05-07 NOTE — ASSESSMENT & PLAN NOTE
Yesterday patient's vitamin B12 level was 206  Today start vitamin B12 1000 mcg IM x 1  Tomorrow start vitamin B12 oral supplementation 1000 mcg p.o. daily  Patient will need to follow-up with his PCP in 10 to 12 weeks to have a repeat vitamin D B12 level drawn

## 2024-05-07 NOTE — PLAN OF CARE
Has not been attending groups,isolated in his room.  Problem: Alteration in Thoughts and Perception  Goal: Attend and participate in unit activities, including therapeutic, recreational, and educational groups  Description: Interventions:  -Encourage Visitation and family involvement in care  Outcome: Not Progressing

## 2024-05-07 NOTE — PLAN OF CARE
Problem: Alteration in Thoughts and Perception  Goal: Verbalize thoughts and feelings  Description: Interventions:  - Promote a nonjudgmental and trusting relationship with the patient through active listening and therapeutic communication  - Assess patient's level of functioning, behavior and potential for risk  - Engage patient in 1 on 1 interactions  - Encourage patient to express fears, feelings, frustrations, and discuss symptoms    - [place] patient to reality, help patient recognize reality-based thinking   - Administer medications as ordered and assess for potential side effects  - Provide the patient education related to the signs and symptoms of the illness and desired effects of prescribed medications  Outcome: Progressing  Goal: Refrain from acting on delusional thinking/internal stimuli  Description: Interventions:  - Monitor patient closely, per order   - Utilize least restrictive measures   - Set reasonable limits, give positive feedback for acceptable   - Administer medications as ordered and monitor of potential side effects  Outcome: Progressing  Goal: Agree to be compliant with medication regime, as prescribed and report medication side effects  Description: Interventions:  - Offer appropriate PRN medication and supervise ingestion; conduct AIMS, as needed   Outcome: Progressing     Problem: Ineffective Coping  Goal: Free from restraint events  Description: - Utilize least restrictive measures   - Provide behavioral interventions   - Redirect inappropriate behaviors   Outcome: Progressing     Problem: Risk for Self Injury/Neglect  Goal: Treatment Goal: Remain safe during length of stay, learn and adopt new coping skills, and be free of self-injurious ideation, impulses and acts at the time of discharge  Outcome: Progressing  Goal: Refrain from harming self  Description: Interventions:  - Monitor patient closely, per order  - Develop a trusting relationship  - Supervise medication ingestion,  monitor effects and side effects   Outcome: Progressing     Problem: Ineffective Coping  Goal: Identifies ineffective coping skills  Outcome: Not Progressing  Goal: Identifies healthy coping skills  Outcome: Not Progressing  Goal: Demonstrates healthy coping skills  Outcome: Not Progressing     Problem: Depression  Goal: Treatment Goal: Demonstrate behavioral control of depressive symptoms, verbalize feelings of improved mood/affect, and adopt new coping skills prior to discharge  Outcome: Not Progressing  Goal: Refrain from isolation  Description: Interventions:  - Develop a trusting relationship   - Encourage socialization   Outcome: Not Progressing     Problem: Anxiety  Goal: Anxiety is at manageable level  Description: Interventions:  - Assess and monitor patient's anxiety level.   - Monitor for signs and symptoms (heart palpitations, chest pain, shortness of breath, headaches, nausea, feeling jumpy, restlessness, irritable, apprehensive).   - Collaborate with interdisciplinary team and initiate plan and interventions as ordered.  - [place] patient to unit/surroundings  - Explain treatment plan  - Encourage participation in care  - Encourage verbalization of concerns/fears  - Identify coping mechanisms  - Assist in developing anxiety-reducing skills  - Administer/offer alternative therapies  - Limit or eliminate stimulants  Outcome: Not Progressing

## 2024-05-07 NOTE — ASSESSMENT & PLAN NOTE
Patient's folic acid level yesterday was 4.5  Start folic acid 1 mg p.o. daily  Patient will need to have a follow-up with his PCP in 10 to 12 weeks and have a repeat folic acid level drawn
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
## 2024-05-07 NOTE — ASSESSMENT & PLAN NOTE
Vitamin D level yesterday was 12.1  Start ergocalciferol 50,000 international units p.o. weekly x 8 weeks  Patient will need to follow-up with PCP in 10 to 12 weeks to have a repeat vitamin D level drawn

## 2024-05-07 NOTE — TREATMENT TEAM
05/06/24 2221   Faulkner Anxiety Scale   Anxious Mood 4   Tension 4   Fears 4   Insomnia 2   Intellectual 2   Depressed Mood 3   Somatic Complaints: Muscular 0   Somatic Complaints: Sensory 0   Cardiovascular Symptoms 0   Respiratory Symptoms 0   Gastrointestinal Symptoms 0   Genitourinary Symptoms 0   Autonomic Symptoms 2   Behavior at Interview 4   Faulkner Anxiety Score 25     1x dose of ativan 1mg PO provided for severe anxiety

## 2024-05-07 NOTE — TREATMENT PLAN
TREATMENT PLAN REVIEW - Behavioral Health Matthew Carlson 53 y.o. 1970 male MRN: [id number]    [address] [facility] [address] [facility] Encounter: [id number]          Admit Date/Time:  5/6/2024  1:27 PM    Treatment Team:   Stephen A Prayson, DO Erin Roman John Daniels Sharrinia Clayton Shernett Rose Terry L Trittenbach, COTA Linda Mayne, LPN Julia Miller, TODD    Diagnosis: Active Problems:    Rhabdomyolysis    Bilateral hand pain    Schizoaffective disorder, bipolar type ([facility])    Medical clearance for psychiatric admission    Homelessness    GERD without esophagitis    Seasonal allergies    Vitamin D deficiency    Vitamin B12 deficiency    Folic acid deficiency    Swollen arm      Patient Strengths/Assets: ability for insight    Patient Barriers/Limitations: homeless    Short Term Goals: decrease in depressive symptoms, decrease in psychotic symptoms    Long Term Goals: improvement in depression    Progress Towards Goals: starting psychiatric medications as prescribed    Recommended Treatment: medication management, patient medication education, group therapy, milieu therapy, continued Behavioral Health psychiatric evaluation/assessment process    Treatment Frequency: daily medication monitoring, group and milieu therapy daily, monitoring through interdisciplinary rounds, monitoring through weekly patient care conferences    Expected Discharge Date:  10 days from admission, defer to primary team    Discharge Plan: discharge to a homeless shelter, deferred to primary team    Treatment Plan Created/Updated By: Briana Castro DO

## 2024-05-07 NOTE — CMS CERTIFICATION NOTE
Recertification: Based upon physical, mental and social evaluations, I certify that inpatient psychiatric services continue to be medically necessary for this patient for a duration of 10 midnights for the treatment of  <principal problem not specified> Available alternative community resources still do not meet the patient's mental health care needs. I further attest that an established written individualized plan of care has been updated and is outlined in the patient's medical records.

## 2024-05-07 NOTE — NURSING NOTE
Pt visible in day room this evening, pleasant on approach. Pt reports depression and anxiety, denies further s/s. Pt expresses desire to go to IP rehab.     Pt with warmed area on R hand from IV infiltration PTA. WC consult placed. Will maintain q7min checks.

## 2024-05-07 NOTE — TREATMENT TEAM
05/06/24 2056   Provider Notification   Reason for Communication Admission   Provider Name Singh   Provider Role Other (Comment)  ([place])   Method of Communication Other (Comment)  (TT)   Response Waiting for response     Reached out to provider regarding pt taking ativan PTA

## 2024-05-08 PROCEDURE — 99232 SBSQ HOSP IP/OBS MODERATE 35: CPT | Performed by: PSYCHIATRY & NEUROLOGY

## 2024-05-08 RX ORDER — BUPROPION HYDROCHLORIDE 300 MG/1
300 TABLET ORAL DAILY
Status: DISCONTINUED | OUTPATIENT
Start: 2024-05-09 | End: 2024-05-10 | Stop reason: HOSPADM

## 2024-05-08 RX ORDER — BUPROPION HYDROCHLORIDE 150 MG/1
150 TABLET ORAL 2 TIMES DAILY
Status: COMPLETED | OUTPATIENT
Start: 2024-05-08 | End: 2024-05-08

## 2024-05-08 RX ORDER — TRAZODONE HYDROCHLORIDE 100 MG/1
100 TABLET ORAL
Status: DISCONTINUED | OUTPATIENT
Start: 2024-05-08 | End: 2024-05-10 | Stop reason: HOSPADM

## 2024-05-08 RX ADMIN — DIBASIC SODIUM PHOSPHATE, MONOBASIC POTASSIUM PHOSPHATE AND MONOBASIC SODIUM PHOSPHATE 1 TABLET: 852; 155; 130 TABLET ORAL at 08:36

## 2024-05-08 RX ADMIN — LORAZEPAM 0.5 MG: 0.5 TABLET ORAL at 09:50

## 2024-05-08 RX ADMIN — Medication 2000 UNITS: at 08:34

## 2024-05-08 RX ADMIN — BUPROPION HYDROCHLORIDE 150 MG: 150 TABLET, FILM COATED, EXTENDED RELEASE ORAL at 08:33

## 2024-05-08 RX ADMIN — BUPROPION HYDROCHLORIDE 150 MG: 150 TABLET, FILM COATED, EXTENDED RELEASE ORAL at 14:16

## 2024-05-08 RX ADMIN — GABAPENTIN 300 MG: 300 CAPSULE ORAL at 08:33

## 2024-05-08 RX ADMIN — MELATONIN TAB 3 MG 6 MG: 3 TAB at 21:18

## 2024-05-08 RX ADMIN — DIBASIC SODIUM PHOSPHATE, MONOBASIC POTASSIUM PHOSPHATE AND MONOBASIC SODIUM PHOSPHATE 1 TABLET: 852; 155; 130 TABLET ORAL at 17:08

## 2024-05-08 RX ADMIN — FOLIC ACID 1 MG: 1 TABLET ORAL at 08:34

## 2024-05-08 RX ADMIN — TRAZODONE HYDROCHLORIDE 100 MG: 100 TABLET ORAL at 23:54

## 2024-05-08 RX ADMIN — TRAZODONE HYDROCHLORIDE 50 MG: 50 TABLET ORAL at 01:42

## 2024-05-08 RX ADMIN — Medication 800 MG: at 08:34

## 2024-05-08 RX ADMIN — CYANOCOBALAMIN TAB 1000 MCG 1000 MCG: 1000 TAB at 08:34

## 2024-05-08 RX ADMIN — GABAPENTIN 300 MG: 300 CAPSULE ORAL at 17:08

## 2024-05-08 RX ADMIN — Medication 800 MG: at 17:08

## 2024-05-08 RX ADMIN — ALUMINUM HYDROXIDE, MAGNESIUM HYDROXIDE, AND DIMETHICONE 30 ML: 200; 20; 200 SUSPENSION ORAL at 19:48

## 2024-05-08 RX ADMIN — LIDOCAINE 2 PATCH: 700 PATCH TOPICAL at 08:36

## 2024-05-08 RX ADMIN — CHLORHEXIDINE GLUCONATE 0.12% ORAL RINSE 15 ML: 1.2 LIQUID ORAL at 21:19

## 2024-05-08 RX ADMIN — NORTRIPTYLINE HYDROCHLORIDE 25 MG: 25 CAPSULE ORAL at 08:35

## 2024-05-08 RX ADMIN — CHLORHEXIDINE GLUCONATE 0.12% ORAL RINSE 15 ML: 1.2 LIQUID ORAL at 08:34

## 2024-05-08 RX ADMIN — LORAZEPAM 0.5 MG: 0.5 TABLET ORAL at 22:26

## 2024-05-08 RX ADMIN — ASPIRIN 81 MG: 81 TABLET, COATED ORAL at 08:34

## 2024-05-08 RX ADMIN — OLANZAPINE 15 MG: 5 TABLET, FILM COATED ORAL at 21:17

## 2024-05-08 RX ADMIN — FLUTICASONE PROPIONATE 2 SPRAY: 50 SPRAY, METERED NASAL at 08:34

## 2024-05-08 NOTE — TREATMENT TEAM
05/08/24 0945   Faulkner Anxiety Scale   Anxious Mood 4   Tension 3   Fears 3   Insomnia 4   Intellectual 2   Depressed Mood 3   Somatic Complaints: Muscular 0   Somatic Complaints: Sensory 0   Cardiovascular Symptoms 0   Respiratory Symptoms 0   Gastrointestinal Symptoms 0   Genitourinary Symptoms 0   Autonomic Symptoms 2   Behavior at Interview 3   Faulkner Anxiety Score 24     Pt given Prn Ativan .5 po.

## 2024-05-08 NOTE — PROGRESS NOTES
05/08/24 0842   Team Meeting   Meeting Type Daily Rounds   Initial Conference Date 05/08/24   Team Members Present   Team Members Present Physician;Nurse;;Occupational Therapist;Other (Discipline and Name)   Physician Team Member Deepa/Ayse   Nursing Team Member Soila   Care Management Team Member Christian MUHAMMAD Team Member Al   Other (Discipline and Name) Jayshree - Pharmacy   Patient/Family Present   Patient Present No   Patient's Family Present No     Endorsing depression and anxiety. He is withdrawn and isolated at times. CWA was at a 2. He signed a 72 and rescinded soon after. Patient is focused on medications. He had Ativan 2x yesterday and Trazadone at 1:52am. He was asking for Ambien as his sleep was still broken He has not been attending groups. Showered on the 6th. Discharge is pending.

## 2024-05-08 NOTE — TREATMENT TEAM
05/07/24 2342   Faulkner Anxiety Scale   Anxious Mood 4   Tension 4   Fears 4   Insomnia 4   Intellectual 3   Depressed Mood 3   Somatic Complaints: Muscular 0   Somatic Complaints: Sensory 0   Cardiovascular Symptoms 0   Respiratory Symptoms 0   Gastrointestinal Symptoms 0   Genitourinary Symptoms 0   Autonomic Symptoms 2   Behavior at Interview 3   Faulkner Anxiety Score 27     Prn ativan 0.5mg provided

## 2024-05-08 NOTE — UTILIZATION REVIEW
NOTIFICATION OF ADMISSION DISCHARGE   This is a Notification of Discharge from [facility]. Please be advised that this patient has been discharge from our facility. Below you will find the admission and discharge date and time including the patient’s disposition.   UTILIZATION REVIEW CONTACT:  Renetta Garica MA  Utilization   Network Utilization Review Department  Phone: [phone] x carefully listen to the prompts. All voicemails are confidential.  Email: [email or web address]     ADMISSION INFORMATION  PRESENTATION DATE: 5/3/2024 10:46 AM  OBERVATION ADMISSION DATE:   INPATIENT ADMISSION DATE: 5/3/24  2:05 PM   DISCHARGE DATE: 5/6/2024  1:22 PM   DISPOSITION:[facility] Utilization Review Department  ATTENTION: Please call with any questions or concerns to [phone] and carefully listen to the prompts so that you are directed to the right person. All voicemails are confidential.   For Discharge needs, contact Care Management DC Support Team at [phone] opt. 2  Send all requests for admission clinical reviews, approved or denied determinations and any other requests to dedicated fax number below belonging to the campus where the patient is receiving treatment. List of dedicated fax numbers for the Facilities:  FACILITY NAME UR FAX NUMBER   ADMISSION DENIALS (Administrative/Medical Necessity) [phone]   DISCHARGE SUPPORT TEAM ([facility]) [phone]   PARENT CHILD HEALTH (Maternity/NICU/Pediatrics) [phone]   [facility] [phone]   [facility] [phone]   [facility] [phone]   [facility] [phone]   [facility] [phone]   [facility] [phone]   [facility] [phone]   [facility]  [phone]   [facility] [phone]   [facility] [phone]   [facility] [phone]   [facility] [phone]

## 2024-05-08 NOTE — PLAN OF CARE
Problem: Alteration in Thoughts and Perception  Goal: Verbalize thoughts and feelings  Description: Interventions:  - Promote a nonjudgmental and trusting relationship with the patient through active listening and therapeutic communication  - Assess patient's level of functioning, behavior and potential for risk  - Engage patient in 1 on 1 interactions  - Encourage patient to express fears, feelings, frustrations, and discuss symptoms    - [place] patient to reality, help patient recognize reality-based thinking   - Administer medications as ordered and assess for potential side effects  - Provide the patient education related to the signs and symptoms of the illness and desired effects of prescribed medications  Outcome: Progressing  Goal: Refrain from acting on delusional thinking/internal stimuli  Description: Interventions:  - Monitor patient closely, per order   - Utilize least restrictive measures   - Set reasonable limits, give positive feedback for acceptable   - Administer medications as ordered and monitor of potential side effects  Outcome: Progressing  Goal: Agree to be compliant with medication regime, as prescribed and report medication side effects  Description: Interventions:  - Offer appropriate PRN medication and supervise ingestion; conduct AIMS, as needed   Outcome: Progressing  Goal: Recognize dysfunctional thoughts, communicate reality-based thoughts at the time of discharge  Description: Interventions:  - Provide medication and psycho-education to assist patient in compliance and developing insight into his/her illness   Outcome: Progressing  Goal: Complete daily ADLs, including personal hygiene independently, as able  Description: Interventions:  - Observe, teach, and assist patient with ADLS  - Monitor and promote a balance of rest/activity, with adequate nutrition and elimination   Outcome: Progressing     Problem: Risk for Self Injury/Neglect  Goal: Treatment Goal: Remain safe during  length of stay, learn and adopt new coping skills, and be free of self-injurious ideation, impulses and acts at the time of discharge  Outcome: Progressing  Goal: Verbalize thoughts and feelings  Description: Interventions:  - Assess and re-assess patient's lethality and potential for self-injury  - Engage patient in 1:1 interactions, daily, for a minimum of 15 minutes  - Encourage patient to express feelings, fears, frustrations, hopes  - Establish rapport/trust with patient   Outcome: Progressing  Goal: Refrain from harming self  Description: Interventions:  - Monitor patient closely, per order  - Develop a trusting relationship  - Supervise medication ingestion, monitor effects and side effects   Outcome: Progressing  Goal: Recognize maladaptive responses and adopt new coping mechanisms  Outcome: Progressing     Problem: Depression  Goal: Verbalize thoughts and feelings  Description: Interventions:  - Assess and re-assess patient's level of risk   - Engage patient in 1:1 interactions, daily, for a minimum of 15 minutes   - Encourage patient to express feelings, fears, frustrations, hopes   Outcome: Progressing  Goal: Refrain from harming self  Description: Interventions:  - Monitor patient closely, per order   - Supervise medication ingestion, monitor effects and side effects   Outcome: Progressing  Goal: Refrain from isolation  Description: Interventions:  - Develop a trusting relationship   - Encourage socialization   Outcome: Progressing  Goal: Refrain from self-neglect  Outcome: Progressing     Problem: Anxiety  Goal: Anxiety is at manageable level  Description: Interventions:  - Assess and monitor patient's anxiety level.   - Monitor for signs and symptoms (heart palpitations, chest pain, shortness of breath, headaches, nausea, feeling jumpy, restlessness, irritable, apprehensive).   - Collaborate with interdisciplinary team and initiate plan and interventions as ordered.  - [place] patient to  unit/surroundings  - Explain treatment plan  - Encourage participation in care  - Encourage verbalization of concerns/fears  - Identify coping mechanisms  - Assist in developing anxiety-reducing skills  - Administer/offer alternative therapies  - Limit or eliminate stimulants  Outcome: Progressing     Problem: Depression  Goal: Treatment Goal: Demonstrate behavioral control of depressive symptoms, verbalize feelings of improved mood/affect, and adopt new coping skills prior to discharge  Outcome: Not Progressing
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
## 2024-05-08 NOTE — TREATMENT TEAM
05/08/24 1607   Team Meeting   Meeting Type Tx Team Meeting   Initial Conference Date 05/08/24   Team Members Present   Team Members Present Physician;Nurse;   Physician Team Member Dr Arenas   Nursing Team Member Esteban   Care Management Team Member Yamini   Patient/Family Present   Patient Present Yes   Patient's Family Present No     Treatment plan and goals reviewed; pt in agreement and signed.

## 2024-05-08 NOTE — PROGRESS NOTES
Progress Note - Behavioral Health   Matthew Carlson 53 y.o. male MRN: [id number]  Unit/Bed#: [facility] 602-01 Encounter: [id number]    Assessment/Plan   Principal Problem:    Schizoaffective disorder, bipolar type (HCC)  Active Problems:    Rhabdomyolysis    Bilateral hand pain    Medical clearance for psychiatric admission    Homelessness    GERD without esophagitis    Seasonal allergies    Vitamin D deficiency    Vitamin B12 deficiency    Folic acid deficiency    Swollen arm      Subjective: Patient was seen, chart was reviewed, and case was discussed with entire team.     Patient seen in room lying in bed.  Patient is overall cooperative however he is edgy insistent and entitled.  He is focused on medications specifically Ativan and Ambien.  Reports depression and anxiety is high wanting Ativan to be given on a scheduled basis around the clock rather than if he needs it.  Patient is asking for more Zyprexa at bedtime.  He reports having auditory hallucinations still.  Patient had disturbed sleep last night.  Patient has been on Wellbutrin  twice daily in the morning and at bedtime.  Discussed with patient about sleep disturbance with Wellbutrin at bedtime and was scheduled the second one for this day at 1:00 in the afternoon.  We will start Wellbutrin  mg daily tomorrow.  Patient is insistent that he still needs Ambien and Ativan no matter what.  Patient has eaten 100% meals so far today.  He is mostly isolative and with only limited interaction with peers.  So far he is medication compliant.        Psychiatric Review of Systems:  Behavior over the last 24 hours: unchanged  Sleep:  Disturbed  Appetite: adequate  Medication side effects: none verbalized  Medical ROS: Complete review of systems is negative except as noted above.            Vitals:  Vitals:    05/08/24 0758   BP: 105/72   Pulse: 86   Resp: 16   Temp: 98.1 °F (36.7 °C)   SpO2: 95%       Mental Status Exam:    Appearance:  appears stated age  and casually dressed   Behavior:  cooperative and edgy entitled   Speech:  normal rate and normal volume   Mood:  anxious and depressed   Affect:  Anxious   Thought Process:  goal directed   Thought Content:  ruminating thoughts, focused on medication   Perceptual Disturbances: auditory hallucinations tell him to harm himself   Risk Potential: Suicidal ideation - None at present  Homicidal ideation - None at present  Potential for aggression - No   Sensorium:  oriented to person, place, and time/date   Memory:  recent memory grossly intact   Consciousness:  alert and awake   Attention/Concentration: attention span and concentration appear shorter than expected for age   Insight:  limited   Judgment: limited   Gait/Station: in bed   Motor Activity: no abnormal movements     Progress Toward Goals: Progressing    Recommended Treatment: Continue with pharmacotherapy, group therapy, milieu therapy and occupational therapy.  Continue frequent safety checks and vitals per unit protocol. Continue with medical management as indicated. Continue coordinating with case management regarding disposition  1.  Increase Zyprexa to 15 mg at bedtime.  Increase as needed trazodone to 100 mg at bedtime if needed for sleep.  Continue CIWA protocol  2.  Discharge planning.  Patient is wanting inpatient rehab program.      Risks, benefits and possible side effects of Medications: Risks, benefits, and possible side effects of medications have been explained to the patient, who verbalizes understanding      Current Medications:  Current Facility-Administered Medications   Medication Dose Route Frequency Provider Last Rate    aluminum-magnesium hydroxide-simethicone  30 mL Oral Q4H PRN Karla Page PA-C      Artificial Tears  1 drop Both Eyes Q3H PRN Karla Page PA-C      aspirin  81 mg Oral Daily Karla Page PA-C      benztropine  1 mg Intramuscular Q4H PRN Max 6/day Karla Page PA-C      benztropine  0.5  mg Oral Q4H PRN Max 6/day Karla Danisha Stives, PA-C      bisacodyl  10 mg Rectal Daily PRN Karla Danisha Stives, PA-C      buPROPion  150 mg Oral BID Karla Danisha Stives, PA-C      chlorhexidine  15 mL Swish & Spit Q12H [facility] Karla Danisha Stives, PA-C      Cholecalciferol  2,000 Units Oral Daily Brandi Ruiz, CRNP      cyanocobalamin  1,000 mcg Oral Daily Brandiabigail Ruiz, CRNP      cyanocobalamin  1,000 mcg Intramuscular Once Brandi Ruiz, CRNP      ergocalciferol  50,000 Units Oral Weekly Brandi Ruiz, CRNP      fluticasone  2 spray Nasal Daily Karla Danisha Stives, PA-C      folic acid  1 mg Oral Daily Brandi Ruiz, CRNP      gabapentin  300 mg Oral BID Karla Danisha Stives, PA-C      hydrOXYzine HCL  25 mg Oral Q6H PRN Max 4/day Karla Adnisha Stives, PA-C      hydrOXYzine HCL  50 mg Oral Q6H PRN Max 4/day Karla Danisha Stives, PA-C      ibuprofen  200 mg Oral Q6H PRN Karla Danisha Stives, PA-C      ibuprofen  400 mg Oral Q6H PRN Karla Danisha Stives, PA-C      ibuprofen  600 mg Oral Q8H PRN Karla Danisha Stives, PA-C      lidocaine  2 patch Topical Daily Brandi Ruiz, TAWANNANP      LORazepam  1 mg Intramuscular Q6H PRN Max 3/day Karla Danisha Stives, PA-C      LORazepam  0.5 mg Oral Q8H PRN Tampb Ilsamanthas, DO      magnesium Oxide  800 mg Oral BID Brandi JungJennifer, CRNP      melatonin  3 mg Oral HS PRN Karla Danisha Stives, PA-C      melatonin  6 mg Oral HS Karla Danisha Stives, PA-C      nortriptyline  25 mg Oral Daily Karla Danisha Stives, PA-C      OLANZapine  5 mg Intramuscular Q3H PRN Max 3/day Karla Danisha Stives, PA-C      OLANZapine  10 mg Oral HS Karla Danisha Stives, PA-C      OLANZapine  2.5 mg Oral Q4H PRN Max 6/day Karla Page, PA-C      OLANZapine  5 mg Oral Q4H PRN Max 3/day Karla Page, PA-HECTOR      OLANZapine  5 mg Oral Q3H PRN Max 3/day Karla Page, PA-HECTOR      pantoprazole  40 mg Oral Early Morning Karla Page, PA-HECTOR      Phosphorous  1 tablet Oral  BID With Meals CARLOS Dominguez      polyethylene glycol  17 g Oral Daily PRN Karla Page PA-C      propranolol  5 mg Oral Q8H PRN Karla Page PA-C      senna-docusate sodium  1 tablet Oral Daily PRN Karla Page PA-C      traZODone  50 mg Oral Q6H PRN Max 3/day Karla Page PA-C         Behavioral Health Medications:   all current active meds have been reviewed.    Laboratory results:  I have personally reviewed all pertinent laboratory/tests results.   Recent Results (from the past 48 hour(s))   ECG 12 lead    Collection Time: 05/07/24  6:00 AM   Result Value Ref Range    Ventricular Rate 81 BPM    Atrial Rate 81 BPM    LA Interval 156 ms    QRSD Interval 88 ms    QT Interval 362 ms    QTC Interval 420 ms    P [place] 19 degrees    QRS Axis 18 degrees    T Wave Axis 33 degrees   CBC and differential    Collection Time: 05/07/24  6:36 AM   Result Value Ref Range    WBC 6.33 4.31 - 10.16 Thousand/uL    RBC 4.08 3.88 - 5.62 Million/uL    Hemoglobin 13.1 12.0 - 17.0 g/dL    Hematocrit 42.3 36.5 - 49.3 %     (H) 82 - 98 fL    MCH 32.1 26.8 - 34.3 pg    MCHC 31.0 (L) 31.4 - 37.4 g/dL    RDW 11.7 11.6 - 15.1 %    MPV 9.8 8.9 - 12.7 fL    Platelets 289 149 - 390 Thousands/uL    nRBC 0 /100 WBCs    Segmented % 47 43 - 75 %    Immature Grans % 0 0 - 2 %    Lymphocytes % 37 14 - 44 %    Monocytes % 9 4 - 12 %    Eosinophils Relative 5 0 - 6 %    Basophils Relative 2 (H) 0 - 1 %    Absolute Neutrophils 2.99 1.85 - 7.62 Thousands/µL    Absolute Immature Grans 0.02 0.00 - 0.20 Thousand/uL    Absolute Lymphocytes 2.34 0.60 - 4.47 Thousands/µL    Absolute Monocytes 0.58 0.17 - 1.22 Thousand/µL    Eosinophils Absolute 0.30 0.00 - 0.61 Thousand/µL    Basophils Absolute 0.10 0.00 - 0.10 Thousands/µL   Basic metabolic panel    Collection Time: 05/07/24 10:24 AM   Result Value Ref Range    Sodium 137 135 - 147 mmol/L    Potassium 3.7 3.5 - 5.3 mmol/L    Chloride 103 96 - 108 mmol/L    CO2  23 21 - 32 mmol/L    ANION GAP 11 4 - 13 mmol/L    BUN 17 5 - 25 mg/dL    Creatinine 0.93 0.60 - 1.30 mg/dL    Glucose 148 (H) 65 - 140 mg/dL    Calcium 9.2 8.4 - 10.2 mg/dL    eGFR 94 ml/min/1.73sq m   CK    Collection Time: 05/07/24 10:24 AM   Result Value Ref Range    Total  (H) 39 - 308 U/L   Magnesium    Collection Time: 05/07/24 10:24 AM   Result Value Ref Range    Magnesium 1.3 (L) 1.9 - 2.7 mg/dL   Phosphorus    Collection Time: 05/07/24 10:24 AM   Result Value Ref Range    Phosphorus 2.6 (L) 2.7 - 4.5 mg/dL   Lipid panel    Collection Time: 05/07/24 10:24 AM   Result Value Ref Range    Cholesterol 204 (H) See Comment mg/dL    Triglycerides 345 (H) See Comment mg/dL    HDL, Direct 45 >=40 mg/dL    LDL Calculated 90 0 - 100 mg/dL    Non-HDL-Chol (CHOL-HDL) 159 mg/dl            Vinay Arenas DO 05/08/24

## 2024-05-08 NOTE — NURSING NOTE
Pt is calm and cooperative with care . Pt is visible on the unit for meals and with limited peer interaction. Pr takes medications as scheduled and has good intake at meals time. Pt reports depression and anxiety and denies all other s/s at this time. Will maintain q 7 mins checks.

## 2024-05-09 LAB
ANION GAP SERPL CALCULATED.3IONS-SCNC: 9 MMOL/L (ref 4–13)
BUN SERPL-MCNC: 16 MG/DL (ref 5–25)
CALCIUM SERPL-MCNC: 9.4 MG/DL (ref 8.4–10.2)
CHLORIDE SERPL-SCNC: 105 MMOL/L (ref 96–108)
CO2 SERPL-SCNC: 23 MMOL/L (ref 21–32)
CREAT SERPL-MCNC: 0.98 MG/DL (ref 0.6–1.3)
GFR SERPL CREATININE-BSD FRML MDRD: 88 ML/MIN/1.73SQ M
GLUCOSE P FAST SERPL-MCNC: 102 MG/DL (ref 65–99)
GLUCOSE SERPL-MCNC: 102 MG/DL (ref 65–140)
MAGNESIUM SERPL-MCNC: 1.8 MG/DL (ref 1.9–2.7)
PHOSPHATE SERPL-MCNC: 3.1 MG/DL (ref 2.7–4.5)
POTASSIUM SERPL-SCNC: 4.3 MMOL/L (ref 3.5–5.3)
SODIUM SERPL-SCNC: 137 MMOL/L (ref 135–147)

## 2024-05-09 PROCEDURE — 83735 ASSAY OF MAGNESIUM: CPT | Performed by: NURSE PRACTITIONER

## 2024-05-09 PROCEDURE — 99232 SBSQ HOSP IP/OBS MODERATE 35: CPT | Performed by: PSYCHIATRY & NEUROLOGY

## 2024-05-09 PROCEDURE — 80048 BASIC METABOLIC PNL TOTAL CA: CPT | Performed by: NURSE PRACTITIONER

## 2024-05-09 PROCEDURE — 84100 ASSAY OF PHOSPHORUS: CPT | Performed by: NURSE PRACTITIONER

## 2024-05-09 RX ORDER — ERGOCALCIFEROL 1.25 MG/1
50000 CAPSULE ORAL WEEKLY
Qty: 4 CAPSULE | Refills: 0 | Status: SHIPPED | OUTPATIENT
Start: 2024-05-14 | End: 2024-07-03

## 2024-05-09 RX ORDER — LANOLIN ALCOHOL/MO/W.PET/CERES
6 CREAM (GRAM) TOPICAL
Qty: 60 TABLET | Refills: 0 | Status: SHIPPED | OUTPATIENT
Start: 2024-05-09 | End: 2024-06-08

## 2024-05-09 RX ORDER — OMEPRAZOLE 40 MG/1
40 CAPSULE, DELAYED RELEASE ORAL DAILY
Qty: 30 CAPSULE | Refills: 0 | Status: SHIPPED | OUTPATIENT
Start: 2024-05-09 | End: 2024-06-08

## 2024-05-09 RX ORDER — LIDOCAINE 50 MG/G
2 PATCH TOPICAL DAILY
Qty: 30 PATCH | Refills: 0 | Status: SHIPPED | OUTPATIENT
Start: 2024-05-10 | End: 2024-05-25

## 2024-05-09 RX ORDER — FLUTICASONE PROPIONATE 50 MCG
2 SPRAY, SUSPENSION (ML) NASAL DAILY
Qty: 15.8 ML | Refills: 0 | Status: SHIPPED | OUTPATIENT
Start: 2024-05-09 | End: 2024-06-08

## 2024-05-09 RX ORDER — GABAPENTIN 300 MG/1
300 CAPSULE ORAL 2 TIMES DAILY
Qty: 60 CAPSULE | Refills: 0 | Status: SHIPPED | OUTPATIENT
Start: 2024-05-09 | End: 2024-06-08

## 2024-05-09 RX ORDER — BUPROPION HYDROCHLORIDE 300 MG/1
300 TABLET ORAL DAILY
Qty: 30 TABLET | Refills: 0 | Status: SHIPPED | OUTPATIENT
Start: 2024-05-10 | End: 2024-06-09

## 2024-05-09 RX ORDER — OLANZAPINE 15 MG/1
15 TABLET ORAL
Qty: 30 TABLET | Refills: 0 | Status: SHIPPED | OUTPATIENT
Start: 2024-05-09 | End: 2024-06-08

## 2024-05-09 RX ORDER — NORTRIPTYLINE HYDROCHLORIDE 25 MG/1
25 CAPSULE ORAL DAILY
Qty: 30 CAPSULE | Refills: 0 | Status: SHIPPED | OUTPATIENT
Start: 2024-05-09 | End: 2024-06-08

## 2024-05-09 RX ORDER — FOLIC ACID 1 MG/1
1 TABLET ORAL DAILY
Qty: 30 TABLET | Refills: 0 | Status: SHIPPED | OUTPATIENT
Start: 2024-05-10 | End: 2024-06-09

## 2024-05-09 RX ADMIN — ALUMINUM HYDROXIDE, MAGNESIUM HYDROXIDE, AND DIMETHICONE 30 ML: 200; 20; 200 SUSPENSION ORAL at 11:21

## 2024-05-09 RX ADMIN — Medication 2000 UNITS: at 08:45

## 2024-05-09 RX ADMIN — CYANOCOBALAMIN TAB 1000 MCG 1000 MCG: 1000 TAB at 08:45

## 2024-05-09 RX ADMIN — LORAZEPAM 0.5 MG: 0.5 TABLET ORAL at 11:21

## 2024-05-09 RX ADMIN — OLANZAPINE 15 MG: 5 TABLET, FILM COATED ORAL at 21:14

## 2024-05-09 RX ADMIN — FOLIC ACID 1 MG: 1 TABLET ORAL at 08:45

## 2024-05-09 RX ADMIN — Medication 800 MG: at 17:00

## 2024-05-09 RX ADMIN — ASPIRIN 81 MG: 81 TABLET, COATED ORAL at 08:45

## 2024-05-09 RX ADMIN — CHLORHEXIDINE GLUCONATE 0.12% ORAL RINSE 15 ML: 1.2 LIQUID ORAL at 21:30

## 2024-05-09 RX ADMIN — NICOTINE POLACRILEX 2 MG: 2 GUM, CHEWING BUCCAL at 16:24

## 2024-05-09 RX ADMIN — LIDOCAINE 2 PATCH: 700 PATCH TOPICAL at 08:45

## 2024-05-09 RX ADMIN — Medication 800 MG: at 08:45

## 2024-05-09 RX ADMIN — FLUTICASONE PROPIONATE 2 SPRAY: 50 SPRAY, METERED NASAL at 08:44

## 2024-05-09 RX ADMIN — BUPROPION HYDROCHLORIDE 300 MG: 300 TABLET, EXTENDED RELEASE ORAL at 08:45

## 2024-05-09 RX ADMIN — CHLORHEXIDINE GLUCONATE 0.12% ORAL RINSE 15 ML: 1.2 LIQUID ORAL at 08:44

## 2024-05-09 RX ADMIN — GABAPENTIN 300 MG: 300 CAPSULE ORAL at 08:45

## 2024-05-09 RX ADMIN — MELATONIN TAB 3 MG 6 MG: 3 TAB at 21:14

## 2024-05-09 RX ADMIN — NORTRIPTYLINE HYDROCHLORIDE 25 MG: 25 CAPSULE ORAL at 08:45

## 2024-05-09 RX ADMIN — TRAZODONE HYDROCHLORIDE 100 MG: 100 TABLET ORAL at 21:28

## 2024-05-09 RX ADMIN — GABAPENTIN 300 MG: 300 CAPSULE ORAL at 17:00

## 2024-05-09 RX ADMIN — ALUMINUM HYDROXIDE, MAGNESIUM HYDROXIDE, AND DIMETHICONE 30 ML: 200; 20; 200 SUSPENSION ORAL at 21:28

## 2024-05-09 NOTE — NURSING NOTE
PT approach nurse's station with request for PRN for insomnia, offered and accepted PRN Trazodone 100mg at 2354. Will monitor for effectiveness in an hour's time.       0057  - Re-assessment Pt observed asleep at this time.

## 2024-05-09 NOTE — NURSING NOTE
Pt is visible on the unit this shift with some peer interaction. Pt is complaint with scheduled medication and has good intake at meals time. Pt reports anxiety and denies all other s/s at this time. Will maintain q 7 mins checks.

## 2024-05-09 NOTE — CASE MANAGEMENT
Cm faxed clinical referrals to pt's rehab choices: [place], [place], [place], [facility], [place], [facility], [place] with request for rehab review.

## 2024-05-09 NOTE — PROGRESS NOTES
Pt attended Coping skills and reflection group.  Pt was able to self express and demonstrate coping skills.  Pt did note he is planning on going to rehab and hopeful;.  Pt incongruent stating he was not a positive person although peers recognized he is and he smiled.     05/09/24 1330   Activity/Group Checklist   Group Other (Comment)  (Coping skills and reflection)   Attendance Attended   Attendance Duration (min) 46-60   Interactions Interacted appropriately   Affect/Mood Incongruent   Goals Achieved Identified feelings;Identified relapse prevention strategies;Able to engage in interactions;Able to listen to others;Able to reflect/comment on own behavior;Able to manage/cope with feelings;Able to recieve feedback;Discussed coping strategies;Discussed discharge plans

## 2024-05-09 NOTE — SIGNIFICANT EVENT
05/09/24 1000 05/09/24 1100   Activity/Group Checklist   Group Community meeting  (therapeutic password) Admission/Discharge  (Pt. completed a relapse prevention plan and recieved a journal requested.)   Attendance Attended Attended   Attendance Duration (min) 31-45 0-15   Interactions Other (Comment)  (passive in responses to group task.) Interacted appropriately  (pt.needed assistance for writing.)   Affect/Mood Constricted Incongruent   Goals Achieved Able to engage in interactions;Discussed coping strategies Able to listen to others;Identified relapse prevention strategies;Discussed coping strategies;Identified resources and support systems;Able to engage in interactions

## 2024-05-09 NOTE — PLAN OF CARE
Problem: Alteration in Thoughts and Perception  Goal: Treatment Goal: Gain control of psychotic behaviors/thinking, reduce/eliminate presenting symptoms and demonstrate improved reality functioning upon discharge  Outcome: Progressing  Goal: Verbalize thoughts and feelings  Description: Interventions:  - Promote a nonjudgmental and trusting relationship with the patient through active listening and therapeutic communication  - Assess patient's level of functioning, behavior and potential for risk  - Engage patient in 1 on 1 interactions  - Encourage patient to express fears, feelings, frustrations, and discuss symptoms    - [place] patient to reality, help patient recognize reality-based thinking   - Administer medications as ordered and assess for potential side effects  - Provide the patient education related to the signs and symptoms of the illness and desired effects of prescribed medications  Outcome: Progressing  Goal: Refrain from acting on delusional thinking/internal stimuli  Description: Interventions:  - Monitor patient closely, per order   - Utilize least restrictive measures   - Set reasonable limits, give positive feedback for acceptable   - Administer medications as ordered and monitor of potential side effects  Outcome: Progressing  Goal: Agree to be compliant with medication regime, as prescribed and report medication side effects  Description: Interventions:  - Offer appropriate PRN medication and supervise ingestion; conduct AIMS, as needed   Outcome: Progressing  Goal: Attend and participate in unit activities, including therapeutic, recreational, and educational groups  Description: Interventions:  -Encourage Visitation and family involvement in care  Outcome: Progressing  Goal: Recognize dysfunctional thoughts, communicate reality-based thoughts at the time of discharge  Description: Interventions:  - Provide medication and psycho-education to assist patient in compliance and developing  insight into his/her illness   Outcome: Progressing  Goal: Complete daily ADLs, including personal hygiene independently, as able  Description: Interventions:  - Observe, teach, and assist patient with ADLS  - Monitor and promote a balance of rest/activity, with adequate nutrition and elimination   Outcome: Progressing     Problem: Ineffective Coping  Goal: Cooperates with admission process  Description: Interventions:   - Complete admission process  Outcome: Progressing  Goal: Identifies ineffective coping skills  Outcome: Progressing  Goal: Identifies healthy coping skills  Outcome: Progressing  Goal: Demonstrates healthy coping skills  Outcome: Progressing  Goal: Participates in unit activities  Description: Interventions:  - Provide therapeutic environment   - Provide required programming   - Redirect inappropriate behaviors   Outcome: Progressing  Goal: Patient/Family participate in treatment and DC plans  Description: Interventions:  - Provide therapeutic environment  Outcome: Progressing  Goal: Patient/Family verbalizes awareness of resources  Outcome: Progressing  Goal: Understands least restrictive measures  Description: Interventions:  - Utilize least restrictive behavior  Outcome: Progressing  Goal: Free from restraint events  Description: - Utilize least restrictive measures   - Provide behavioral interventions   - Redirect inappropriate behaviors   Outcome: Progressing     Problem: Risk for Self Injury/Neglect  Goal: Treatment Goal: Remain safe during length of stay, learn and adopt new coping skills, and be free of self-injurious ideation, impulses and acts at the time of discharge  Outcome: Progressing  Goal: Verbalize thoughts and feelings  Description: Interventions:  - Assess and re-assess patient's lethality and potential for self-injury  - Engage patient in 1:1 interactions, daily, for a minimum of 15 minutes  - Encourage patient to express feelings, fears, frustrations, hopes  - Establish  rapport/trust with patient   Outcome: Progressing  Goal: Refrain from harming self  Description: Interventions:  - Monitor patient closely, per order  - Develop a trusting relationship  - Supervise medication ingestion, monitor effects and side effects   Outcome: Progressing  Goal: Attend and participate in unit activities, including therapeutic, recreational, and educational groups  Description: Interventions:  - Provide therapeutic and educational activities daily, encourage attendance and participation, and document same in the medical record  - Obtain collateral information, encourage visitation and family involvement in care   Outcome: Progressing  Goal: Recognize maladaptive responses and adopt new coping mechanisms  Outcome: Progressing  Goal: Complete daily ADLs, including personal hygiene independently, as able  Description: Interventions:  - Observe, teach, and assist patient with ADLS  - Monitor and promote a balance of rest/activity, with adequate nutrition and elimination  Outcome: Progressing     Problem: Depression  Goal: Treatment Goal: Demonstrate behavioral control of depressive symptoms, verbalize feelings of improved mood/affect, and adopt new coping skills prior to discharge  Outcome: Progressing  Goal: Verbalize thoughts and feelings  Description: Interventions:  - Assess and re-assess patient's level of risk   - Engage patient in 1:1 interactions, daily, for a minimum of 15 minutes   - Encourage patient to express feelings, fears, frustrations, hopes   Outcome: Progressing  Goal: Refrain from harming self  Description: Interventions:  - Monitor patient closely, per order   - Supervise medication ingestion, monitor effects and side effects   Outcome: Progressing  Goal: Refrain from isolation  Description: Interventions:  - Develop a trusting relationship   - Encourage socialization   Outcome: Progressing  Goal: Refrain from self-neglect  Outcome: Progressing  Goal: Attend and participate in  unit activities, including therapeutic, recreational, and educational groups  Description: Interventions:  - Provide therapeutic and educational activities daily, encourage attendance and participation, and document same in the medical record   Outcome: Progressing  Goal: Complete daily ADLs, including personal hygiene independently, as able  Description: Interventions:  - Observe, teach, and assist patient with ADLS  -  Monitor and promote a balance of rest/activity, with adequate nutrition and elimination   Outcome: Progressing     Problem: Anxiety  Goal: Anxiety is at manageable level  Description: Interventions:  - Assess and monitor patient's anxiety level.   - Monitor for signs and symptoms (heart palpitations, chest pain, shortness of breath, headaches, nausea, feeling jumpy, restlessness, irritable, apprehensive).   - Collaborate with interdisciplinary team and initiate plan and interventions as ordered.  - [place] patient to unit/surroundings  - Explain treatment plan  - Encourage participation in care  - Encourage verbalization of concerns/fears  - Identify coping mechanisms  - Assist in developing anxiety-reducing skills  - Administer/offer alternative therapies  - Limit or eliminate stimulants  Outcome: Progressing      Unknown

## 2024-05-09 NOTE — TREATMENT TEAM
05/09/24 0835   Team Meeting   Meeting Type Daily Rounds   Initial Conference Date 05/09/24   Team Members Present   Team Members Present Physician;Nurse;   Physician Team Member Dr Arenas, Dr Tavera, Katarina GONZALEZ   Nursing Team Member Soila   Care Management Team Member Yamini MUHAMMAD Team Member Al   Patient/Family Present   Patient Present No   Patient's Family Present No     Withdrawn, somatic, med seeking, remains on CIWA, med compliant, eating 100%, showered 5/6, PRN Ativan 0.5 mg at 2226, PRN Trazodone 100 mg at 2354; then slept, not attending group

## 2024-05-09 NOTE — NURSING NOTE
Pt withdrawn to room most of evening. Some somatic complaints noted. Pt focused on hand were the IV infiltrated prior to floor admission. Hand is slightly red and was not warm to touch. Denies SI/HI. Q 7 minute checks maintained.

## 2024-05-09 NOTE — DISCHARGE INSTR - OTHER ORDERS
Wound Care Plan:   1-Apply skin moisturizing cream (purple top lotion) to hands and forearms three times daily after application of warm compress.
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
## 2024-05-09 NOTE — PROGRESS NOTES
Progress Note - Behavioral Health   Matthew Carlson 53 y.o. male MRN: [id number]  Unit/Bed#: [facility] 602[facility]01 Encounter: [id number]    Assessment/Plan   Principal Problem:    Schizoaffective disorder, bipolar type (HCC)  Active Problems:    Rhabdomyolysis    Bilateral hand pain    Medical clearance for psychiatric admission    Homelessness    GERD without esophagitis    Seasonal allergies    Vitamin D deficiency    Vitamin B12 deficiency    Folic acid deficiency    Swollen arm      Subjective: Patient was seen, chart was reviewed, and case was discussed with entire team.       Patient seen out in milieu.  He is edgy but does remain calm and cooperative.  Patient had come out of a group and went back into group after visit.  Patient is again focused on medications especially on Ativan and Ambien.  Patient is reminded of the fact that we will not be prescribing Ambien or Ativan.  Patient took it well but disappointed.  Discussed patient going to rehab as the  has been sending referrals out and it could be any time that he will be leaving.  Patient's medications was sent down to this [facility] to be filled and brought upstairs so as to be ready should the patient be accepted by a rehab.  Patient is hopeful to be discharged today.  Patient denies having any thoughts to harm himself or anyone else.  Denies any auditory or visualizations today.  Patient reports he did not sleep well last night.  However patient did receive trazodone 100 mg and nursing reports he slept after that.  Patient is with adequate oral intake.  He is medication compliant.  Calm but edgy      Psychiatric Review of Systems:  Behavior over the last 24 hours: unchanged  Sleep:  Disturbed  Appetite: adequate  Medication side effects: none verbalized  Medical ROS: Complete review of systems is negative except as noted above.            Vitals:  Vitals:    05/09/24 0742   BP: 101/68   Pulse: 90   Resp: 18   Temp: (!) 97.1 °F (36.2 °C)   SpO2:  93%       Mental Status Exam:    Appearance:  appears stated age and casually dressed   Behavior:  cooperative and calm but edgy and entitled   Speech:  normal rate and normal volume   Mood:  anxious and depressed   Affect:  Anxious   Thought Process:  goal directed   Thought Content:  focused on medication   Perceptual Disturbances: no reported hallucinations   Risk Potential: Suicidal ideation - None at present  Homicidal ideation - None at present  Potential for aggression - No   Sensorium:  oriented to person, place, and time/date   Memory:  recent memory grossly intact   Consciousness:  alert and awake   Attention/Concentration: attention span and concentration appear shorter than expected for age   Insight:  limited   Judgment: limited   Gait/Station: in bed   Motor Activity: no abnormal movements     Progress Toward Goals: Progressing    Recommended Treatment: Continue with pharmacotherapy, group therapy, milieu therapy and occupational therapy.  Continue frequent safety checks and vitals per unit protocol. Continue with medical management as indicated. Continue coordinating with case management regarding disposition  1.  Continue current medications and treatments for now.  2.  Discharge planning.  Patient is wanting inpatient rehab program.      Risks, benefits and possible side effects of Medications: Risks, benefits, and possible side effects of medications have been explained to the patient, who verbalizes understanding      Current Medications:  Current Facility-Administered Medications   Medication Dose Route Frequency Provider Last Rate    aluminum-magnesium hydroxide-simethicone  30 mL Oral Q4H PRN Karla Page PA-C      Artificial Tears  1 drop Both Eyes Q3H PRN Karla Page PA-C      aspirin  81 mg Oral Daily Karla Page PA-C      benztropine  1 mg Intramuscular Q4H PRN Max 6/day Karla Page PA-C      benztropine  0.5 mg Oral Q4H PRN Max 6/day Karla Page  PA-C      bisacodyl  10 mg Rectal Daily PRN Karla Danisha Stives, PA-C      buPROPion  300 mg Oral Daily Vinay A Prayson, DO      chlorhexidine  15 mL Swish & Spit Q12H [facility] Karla Danisha Stives, PA-C      Cholecalciferol  2,000 Units Oral Daily Brandi RamosAnaGrande, CRNP      cyanocobalamin  1,000 mcg Oral Daily Brandi RamosAnaGrande, CRNP      cyanocobalamin  1,000 mcg Intramuscular Once Brandi RamosAnaGrande, CRNP      ergocalciferol  50,000 Units Oral Weekly Brandi RamosAnaGrande, CRNP      fluticasone  2 spray Nasal Daily Karla Danisha Stives, PA-C      folic acid  1 mg Oral Daily Brandi RamosAnaGrande, CRNP      gabapentin  300 mg Oral BID Karla Danisha Stives, PA-C      hydrOXYzine HCL  25 mg Oral Q6H PRN Max 4/day Karla Danisha Stives, PA-C      hydrOXYzine HCL  50 mg Oral Q6H PRN Max 4/day Karla Danisha Stives, PA-C      ibuprofen  200 mg Oral Q6H PRN Karla Danisha Stives, PA-C      ibuprofen  400 mg Oral Q6H PRN Karla Danisha Stives, PA-C      ibuprofen  600 mg Oral Q8H PRN Karla Danisha Stives, PA-C      lidocaine  2 patch Topical Daily Brandiabigail Ruiz, CRNP      LORazepam  1 mg Intramuscular Q6H PRN Max 3/day Karla Danisha Stives, PA-C      LORazepam  0.5 mg Oral Q8H PRN Tamoor Ilyas, DO      magnesium Oxide  800 mg Oral BID Brandi Ruiz, CRNP      melatonin  3 mg Oral HS PRN Karla Danisha Stives, PA-C      melatonin  6 mg Oral HS Karla Danisha Stives, PA-C      nortriptyline  25 mg Oral Daily Karla Danisha Stives, PA-C      OLANZapine  5 mg Intramuscular Q3H PRN Max 3/day Karla Danisha Stives, PA-C      OLANZapine  15 mg Oral HS Vinay A Prayson, DO      OLANZapine  2.5 mg Oral Q4H PRN Max 6/day Karla Danisha Stives, PA-C      OLANZapine  5 mg Oral Q4H PRN Max 3/day Karla Page PA-C      OLANZapine  5 mg Oral Q3H PRN Max 3/day Karla Page PA-C      pantoprazole  40 mg Oral Early Morning Karla Page, JOHN      polyethylene glycol  17 g Oral Daily PRN Karla Page PA-C       propranolol  5 mg Oral Q8H PRN Karla Page PA-C      senna-docusate sodium  1 tablet Oral Daily PRN Karla Page PA-C      traZODone  100 mg Oral Q6H PRN Max 3/day Vinay Arenas DO         Behavioral Health Medications:   all current active meds have been reviewed.    Laboratory results:  I have personally reviewed all pertinent laboratory/tests results.   Recent Results (from the past 48 hour(s))   Magnesium    Collection Time: 05/09/24  6:18 AM   Result Value Ref Range    Magnesium 1.8 (L) 1.9 - 2.7 mg/dL   Phosphorus    Collection Time: 05/09/24  6:18 AM   Result Value Ref Range    Phosphorus 3.1 2.7 - 4.5 mg/dL   Basic metabolic panel    Collection Time: 05/09/24  6:18 AM   Result Value Ref Range    Sodium 137 135 - 147 mmol/L    Potassium 4.3 3.5 - 5.3 mmol/L    Chloride 105 96 - 108 mmol/L    CO2 23 21 - 32 mmol/L    ANION GAP 9 4 - 13 mmol/L    BUN 16 5 - 25 mg/dL    Creatinine 0.98 0.60 - 1.30 mg/dL    Glucose 102 65 - 140 mg/dL    Glucose, Fasting 102 (H) 65 - 99 mg/dL    Calcium 9.4 8.4 - 10.2 mg/dL    eGFR 88 ml/min/1.73sq tee Arenas DO 05/09/24

## 2024-05-09 NOTE — TREATMENT TEAM
05/08/24 2231   Faulkner Anxiety Scale   Anxious Mood 4   Tension 4   Fears 3   Insomnia 4   Intellectual 2   Depressed Mood 3   Somatic Complaints: Muscular 0   Somatic Complaints: Sensory 0   Cardiovascular Symptoms 0   Respiratory Symptoms 0   Gastrointestinal Symptoms 0   Genitourinary Symptoms 0   Autonomic Symptoms 2   Behavior at Interview 3   Faulkner Anxiety Score 25     Patient having increased anxiety related to insomnia says the Trazodone does not help him.

## 2024-05-09 NOTE — NURSING NOTE
"Patient visible on unit pacing, socializing, or in room reading. Patient preoccupied with discharging. Patient heard telling staff \"the Dr lied to me! He said he would be sending out referrals to rehabs but the rehab told me they can't take me if I'm actively withdrawing and getting ativan which the  Told him I am. The  Screwed me!\". Patient redirected by staff and advised to wait for the next day to speak with case management. Patient med compliant. Patient requested new order of Nicorette which was obtained and given. Patients appetite intact. VSS. Continual safety checks ongoing  "

## 2024-05-09 NOTE — CASE MANAGEMENT
CM rec'd message from Nitish Franklin at [facility] admissions; pt's referral denied due to not in network with pt's insurance.     CM met with pt, provided pt with # for [facility] admissions for pt to complete phone intake. CM also provided pt with [place] tel# for pt to call and check on referral.    Pt reported completing phone intake with [facility].     Call made to [facility] admissions, spoke with Caty; informed admissions RN was concerned regarding pt's Ativan taper and therefore pt would not be done until 5/11; concerns about pt's reported AH. CM informed Caty of pt denying all psych s/s. Caty requested documentation; [facility] faxed RN note stating pt denied s/s. Caty reports pt rec'd Ativan PRN today and facility does not accept within 24 hr of taking Ativan. Caty to double check this policy and contact CM once RN reviewed note CM sent.     Pt reports he contacted [facility] and was told clinical being reviewed.

## 2024-05-09 NOTE — PLAN OF CARE
Pt. Attended groups and engaged in relapse prevention plan. Cooperatve and social with peers in his immediate sitting area.  Problem: Ineffective Coping  Goal: Participates in unit activities  Description: Interventions:  - Provide therapeutic environment   - Provide required programming   - Redirect inappropriate behaviors   Outcome: Progressing      No

## 2024-05-10 VITALS
SYSTOLIC BLOOD PRESSURE: 118 MMHG | HEIGHT: 70 IN | TEMPERATURE: 97.1 F | RESPIRATION RATE: 18 BRPM | OXYGEN SATURATION: 98 % | WEIGHT: 194.2 LBS | HEART RATE: 99 BPM | DIASTOLIC BLOOD PRESSURE: 67 MMHG | BODY MASS INDEX: 27.8 KG/M2

## 2024-05-10 PROCEDURE — 99238 HOSP IP/OBS DSCHRG MGMT 30/<: CPT | Performed by: PSYCHIATRY & NEUROLOGY

## 2024-05-10 PROCEDURE — 99232 SBSQ HOSP IP/OBS MODERATE 35: CPT | Performed by: PSYCHIATRY & NEUROLOGY

## 2024-05-10 RX ORDER — FAMOTIDINE 20 MG/1
20 TABLET, FILM COATED ORAL 2 TIMES DAILY
Status: DISCONTINUED | OUTPATIENT
Start: 2024-05-10 | End: 2024-05-10 | Stop reason: HOSPADM

## 2024-05-10 RX ADMIN — FOLIC ACID 1 MG: 1 TABLET ORAL at 08:44

## 2024-05-10 RX ADMIN — ASPIRIN 81 MG: 81 TABLET, COATED ORAL at 08:44

## 2024-05-10 RX ADMIN — NORTRIPTYLINE HYDROCHLORIDE 25 MG: 25 CAPSULE ORAL at 08:44

## 2024-05-10 RX ADMIN — LIDOCAINE 2 PATCH: 700 PATCH TOPICAL at 08:46

## 2024-05-10 RX ADMIN — CYANOCOBALAMIN TAB 1000 MCG 1000 MCG: 1000 TAB at 08:44

## 2024-05-10 RX ADMIN — FAMOTIDINE 20 MG: 20 TABLET ORAL at 10:50

## 2024-05-10 RX ADMIN — FLUTICASONE PROPIONATE 2 SPRAY: 50 SPRAY, METERED NASAL at 08:46

## 2024-05-10 RX ADMIN — GABAPENTIN 300 MG: 300 CAPSULE ORAL at 08:44

## 2024-05-10 RX ADMIN — Medication 2000 UNITS: at 08:44

## 2024-05-10 RX ADMIN — CHLORHEXIDINE GLUCONATE 0.12% ORAL RINSE 15 ML: 1.2 LIQUID ORAL at 08:44

## 2024-05-10 RX ADMIN — NICOTINE POLACRILEX 2 MG: 2 GUM, CHEWING BUCCAL at 09:51

## 2024-05-10 RX ADMIN — BUPROPION HYDROCHLORIDE 300 MG: 300 TABLET, EXTENDED RELEASE ORAL at 08:44

## 2024-05-10 RX ADMIN — ALUMINUM HYDROXIDE, MAGNESIUM HYDROXIDE, AND DIMETHICONE 30 ML: 200; 20; 200 SUSPENSION ORAL at 09:51

## 2024-05-10 NOTE — CASE MANAGEMENT
Call made to [facility] admissions tel# [phone] reports clinical was reviewed and pt must be 72 hrs free from AH, no exception.

## 2024-05-10 NOTE — PLAN OF CARE
Problem: Alteration in Thoughts and Perception  Goal: Treatment Goal: Gain control of psychotic behaviors/thinking, reduce/eliminate presenting symptoms and demonstrate improved reality functioning upon discharge  Outcome: Adequate for Discharge  Goal: Verbalize thoughts and feelings  Description: Interventions:  - Promote a nonjudgmental and trusting relationship with the patient through active listening and therapeutic communication  - Assess patient's level of functioning, behavior and potential for risk  - Engage patient in 1 on 1 interactions  - Encourage patient to express fears, feelings, frustrations, and discuss symptoms    - [place] patient to reality, help patient recognize reality-based thinking   - Administer medications as ordered and assess for potential side effects  - Provide the patient education related to the signs and symptoms of the illness and desired effects of prescribed medications  Outcome: Adequate for Discharge  Goal: Refrain from acting on delusional thinking/internal stimuli  Description: Interventions:  - Monitor patient closely, per order   - Utilize least restrictive measures   - Set reasonable limits, give positive feedback for acceptable   - Administer medications as ordered and monitor of potential side effects  Outcome: Adequate for Discharge  Goal: Agree to be compliant with medication regime, as prescribed and report medication side effects  Description: Interventions:  - Offer appropriate PRN medication and supervise ingestion; conduct AIMS, as needed   Outcome: Adequate for Discharge  Goal: Attend and participate in unit activities, including therapeutic, recreational, and educational groups  Description: Interventions:  -Encourage Visitation and family involvement in care  Outcome: Adequate for Discharge  Goal: Recognize dysfunctional thoughts, communicate reality-based thoughts at the time of discharge  Description: Interventions:  - Provide medication and  psycho-education to assist patient in compliance and developing insight into his/her illness   Outcome: Adequate for Discharge  Goal: Complete daily ADLs, including personal hygiene independently, as able  Description: Interventions:  - Observe, teach, and assist patient with ADLS  - Monitor and promote a balance of rest/activity, with adequate nutrition and elimination   Outcome: Adequate for Discharge     Problem: Ineffective Coping  Goal: Cooperates with admission process  Description: Interventions:   - Complete admission process  Outcome: Adequate for Discharge  Goal: Identifies ineffective coping skills  Outcome: Adequate for Discharge  Goal: Identifies healthy coping skills  Outcome: Adequate for Discharge  Goal: Demonstrates healthy coping skills  Outcome: Adequate for Discharge  Goal: Participates in unit activities  Description: Interventions:  - Provide therapeutic environment   - Provide required programming   - Redirect inappropriate behaviors   Outcome: Adequate for Discharge  Goal: Patient/Family participate in treatment and DC plans  Description: Interventions:  - Provide therapeutic environment  Outcome: Adequate for Discharge  Goal: Patient/Family verbalizes awareness of resources  Outcome: Adequate for Discharge  Goal: Understands least restrictive measures  Description: Interventions:  - Utilize least restrictive behavior  Outcome: Adequate for Discharge  Goal: Free from restraint events  Description: - Utilize least restrictive measures   - Provide behavioral interventions   - Redirect inappropriate behaviors   Outcome: Adequate for Discharge     Problem: Risk for Self Injury/Neglect  Goal: Treatment Goal: Remain safe during length of stay, learn and adopt new coping skills, and be free of self-injurious ideation, impulses and acts at the time of discharge  Outcome: Adequate for Discharge  Goal: Verbalize thoughts and feelings  Description: Interventions:  - Assess and re-assess patient's  lethality and potential for self-injury  - Engage patient in 1:1 interactions, daily, for a minimum of 15 minutes  - Encourage patient to express feelings, fears, frustrations, hopes  - Establish rapport/trust with patient   Outcome: Adequate for Discharge  Goal: Refrain from harming self  Description: Interventions:  - Monitor patient closely, per order  - Develop a trusting relationship  - Supervise medication ingestion, monitor effects and side effects   Outcome: Adequate for Discharge  Goal: Attend and participate in unit activities, including therapeutic, recreational, and educational groups  Description: Interventions:  - Provide therapeutic and educational activities daily, encourage attendance and participation, and document same in the medical record  - Obtain collateral information, encourage visitation and family involvement in care   Outcome: Adequate for Discharge  Goal: Recognize maladaptive responses and adopt new coping mechanisms  Outcome: Adequate for Discharge  Goal: Complete daily ADLs, including personal hygiene independently, as able  Description: Interventions:  - Observe, teach, and assist patient with ADLS  - Monitor and promote a balance of rest/activity, with adequate nutrition and elimination  Outcome: Adequate for Discharge     Problem: Depression  Goal: Treatment Goal: Demonstrate behavioral control of depressive symptoms, verbalize feelings of improved mood/affect, and adopt new coping skills prior to discharge  Outcome: Adequate for Discharge  Goal: Verbalize thoughts and feelings  Description: Interventions:  - Assess and re-assess patient's level of risk   - Engage patient in 1:1 interactions, daily, for a minimum of 15 minutes   - Encourage patient to express feelings, fears, frustrations, hopes   Outcome: Adequate for Discharge  Goal: Refrain from harming self  Description: Interventions:  - Monitor patient closely, per order   - Supervise medication ingestion, monitor effects and  side effects   Outcome: Adequate for Discharge  Goal: Refrain from isolation  Description: Interventions:  - Develop a trusting relationship   - Encourage socialization   Outcome: Adequate for Discharge  Goal: Refrain from self-neglect  Outcome: Adequate for Discharge  Goal: Attend and participate in unit activities, including therapeutic, recreational, and educational groups  Description: Interventions:  - Provide therapeutic and educational activities daily, encourage attendance and participation, and document same in the medical record   Outcome: Adequate for Discharge  Goal: Complete daily ADLs, including personal hygiene independently, as able  Description: Interventions:  - Observe, teach, and assist patient with ADLS  -  Monitor and promote a balance of rest/activity, with adequate nutrition and elimination   Outcome: Adequate for Discharge     Problem: Anxiety  Goal: Anxiety is at manageable level  Description: Interventions:  - Assess and monitor patient's anxiety level.   - Monitor for signs and symptoms (heart palpitations, chest pain, shortness of breath, headaches, nausea, feeling jumpy, restlessness, irritable, apprehensive).   - Collaborate with interdisciplinary team and initiate plan and interventions as ordered.  - [place] patient to unit/surroundings  - Explain treatment plan  - Encourage participation in care  - Encourage verbalization of concerns/fears  - Identify coping mechanisms  - Assist in developing anxiety-reducing skills  - Administer/offer alternative therapies  - Limit or eliminate stimulants  Outcome: Adequate for Discharge     Problem: DISCHARGE PLANNING  Goal: Discharge to home or other facility with appropriate resources  Description: INTERVENTIONS:  - Identify barriers to discharge w/patient and caregiver  - Arrange for needed discharge resources and transportation as appropriate  - Identify discharge learning needs (meds, wound care, etc.)  - Arrange for interpretive services to  assist at discharge as needed  - Refer to Case Management Department for coordinating discharge planning if the patient needs post-hospital services based on physician/advanced practitioner order or complex needs related to functional status, cognitive ability, or social support system  Outcome: Adequate for Discharge

## 2024-05-10 NOTE — CASE MANAGEMENT
Call [facility] tel# [phone], spoke with Ysabel. FUAD informed that pt's referral was denied due to ongoing AH. FUAD informed Ysabel that pt is denying psych s/s since 5/9. Ysabel reports CM can submit updated clinical.  FUAD faxed MD note from 5/9 to  admissions.

## 2024-05-10 NOTE — NURSING NOTE
Patient visible in the milieu, calm, pleasant and social. Patient D/C to facility today. VSS, denies any pain and discomfort. Patient denies anxiety/depression SI/HI, AVH. Medication reviewed with patient and patient verbalized understanding. Patient left unit with all belongings accompanied with staff.

## 2024-05-10 NOTE — TREATMENT TEAM
Pt attended  Recovery: principles group.  Pt pleasant, solemn and blunt affect.  Pt indicated he does not have Hope.  Pt did note he used to have hope.  Pt expressed that he wants to go to rehab for d/c but expressed uncertainty.      05/10/24 1100   Activity/Group Checklist   Group Other (Comment)  ( Recovery: principles)   Attendance Attended   Attendance Duration (min) 31-45   Interactions Other (Comment)  (neeed prompting)   Affect/Mood Blunted/flat   Goals Achieved Identified feelings;Identified relapse prevention strategies;Identified triggers;Discussed self-esteem issues;Discussed coping strategies;Able to listen to others;Able to engage in interactions;Able to reflect/comment on own behavior;Able to self-disclose;Able to manage/cope with feelings;Identified resources and support systems;Identified distorted thoughts/beliefs;Able to recieve feedback

## 2024-05-10 NOTE — CASE MANAGEMENT
Call made to [facility] admissions tel# [phone]; CM spoke with Katarina and reviewed rehab request. Katarina reports nursing in admissions remains concerned that pt is only 24 hr free of AH and requested review again on Monday. Cain informed Katarina that pt is cleared for d/c and unable to remain until Monday. CM to fax updated clinical to admissions.    Cm faxed most recent MD note to [facility] admissions.
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
## 2024-05-10 NOTE — TREATMENT TEAM
05/10/24 0842   Team Meeting   Meeting Type Daily Rounds   Initial Conference Date 05/10/24   Team Members Present   Team Members Present Physician;Nurse;;Occupational Therapist   Physician Team Member Dr Arenas, Dr Heredia, Katarina GONZLAEZ   Nursing Team Member Soila   Care Management Team Member Yamini MUHAMMAD Team Member Conchita HUFFMAN   Patient/Family Present   Patient Present No   Patient's Family Present No     Anxious, preoccupied with dc, upset about rehab denial due to pt taking ativan, med compliant, CIWA discontinued, Ativan PRN discontinued, eating well, slept well PRN trazodone 100mg at 2128, slept well after PRN, attending groups. DC pending inpt rehab bed

## 2024-05-10 NOTE — BH TRANSITION RECORD
Contact Information: If you have any questions, concerns, pended studies, tests and/or procedures, or emergencies regarding your inpatient behavioral health visit. Please contact [facility] [phone] and ask to speak to a , nurse or physician. A contact is available 24 hours/ 7 days a week at this number.     Summary of Procedures Performed During your Stay:  Below is a list of major procedures performed during your hospital stay and a summary of results:  - No major procedures performed.    Pending Studies (From admission, onward)      None          Please follow up on the above pending studies with your PCP and/or referring provider.

## 2024-05-10 NOTE — PROGRESS NOTES
Progress Note - Behavioral Health   Matthew Carlson 53 y.o. male MRN: [id number]  Unit/Bed#: [facility] 602-01 Encounter: [id number]    Assessment/Plan   Principal Problem:    Schizoaffective disorder, bipolar type (HCC)  Active Problems:    Rhabdomyolysis    Bilateral hand pain    Medical clearance for psychiatric admission    Homelessness    GERD without esophagitis    Seasonal allergies    Vitamin D deficiency    Vitamin B12 deficiency    Folic acid deficiency    Swollen arm      Subjective: Patient was seen, chart was reviewed, and case was discussed with entire team.     Patient was seen out in milieu.  He is reading a book.  He is calm and cooperative today with this writer.  He does endorse some anxiety however it is controllable.  The patient has been lorazepam free for over 24 hours.  He has not even asked which is an improvement.  Patient is hopeful for discharge today to a rehab program.  Patient verbalizes his mood has improved and he is without any hallucinations verbal or visual.  Patient reports sleeping better last night and he has been with adequate appetite.  Patient without any behavioral outbursts or agitation today.  He has been attending groups and interacting appropriately.  Patient has been medication compliant.      Psychiatric Review of Systems:  Behavior over the last 24 hours: improved  Sleep: improving  Appetite: adequate  Medication side effects: none verbalized  Medical ROS: Complete review of systems is negative except as noted above.            Vitals:  Vitals:    05/10/24 0756   BP: 118/67   Pulse: 99   Resp: 18   Temp: (!) 97.1 °F (36.2 °C)   SpO2: 98%       Mental Status Exam:    Appearance:  appears stated age and casually dressed   Behavior:  cooperative and calm    Speech:  normal rate and normal volume   Mood:  anxious and depressed   Affect:  Anxious but controllable   Thought Process:  goal directed   Thought Content:  No overt delusions or paranoia material verbalized    Perceptual Disturbances: no reported hallucinations   Risk Potential: Suicidal ideation - None at present  Homicidal ideation - None at present  Potential for aggression - No   Sensorium:  oriented to person, place, and time/date   Memory:  recent memory grossly intact   Consciousness:  alert and awake   Attention/Concentration: attention span and concentration appear shorter than expected for age   Insight:  limited   Judgment: limited   Gait/Station: in bed   Motor Activity: no abnormal movements     Progress Toward Goals: Progressing    Recommended Treatment: Continue with pharmacotherapy, group therapy, milieu therapy and occupational therapy.  Continue frequent safety checks and vitals per unit protocol. Continue with medical management as indicated. Continue coordinating with case management regarding disposition  1.  Continue current medications and treatments for now.  2.  Discharge planning.     Risks, benefits and possible side effects of Medications: Risks, benefits, and possible side effects of medications have been explained to the patient, who verbalizes understanding      Current Medications:  Current Facility-Administered Medications   Medication Dose Route Frequency Provider Last Rate    aluminum-magnesium hydroxide-simethicone  30 mL Oral Q4H PRN Karla Page PA-C      Artificial Tears  1 drop Both Eyes Q3H PRN Karla Page PA-C      aspirin  81 mg Oral Daily JASPREET Luciano-HECTOR      benztropine  1 mg Intramuscular Q4H PRN Max 6/day JASPREET Luciano-HECTOR      benztropine  0.5 mg Oral Q4H PRN Max 6/day Karla Page PA-C      bisacodyl  10 mg Rectal Daily PRN JASPREET Luciano-HECTOR      buPROPion  300 mg Oral Daily Vinay Arenas DO      chlorhexidine  15 mL Swish & Spit Q12H [facility] Karla Page PA-C      Cholecalciferol  2,000 Units Oral Daily CARLOS Dominguez      cyanocobalamin  1,000 mcg Oral Daily CARLOS Dominguez       cyanocobalamin  1,000 mcg Intramuscular Once Brandi RachelMoon, CRNP      ergocalciferol  50,000 Units Oral Weekly Brandi RachelAnaGrande, CRNP      fluticasone  2 spray Nasal Daily Karla Danisha Stives, PA-C      folic acid  1 mg Oral Daily Brandiabigail RamosAnaGrande, CRNP      gabapentin  300 mg Oral BID Karla Danisha Stives, PA-C      hydrOXYzine HCL  25 mg Oral Q6H PRN Max 4/day Karla Danisha Stives, PA-C      hydrOXYzine HCL  50 mg Oral Q6H PRN Max 4/day Karla Danisha Stives, PA-C      ibuprofen  200 mg Oral Q6H PRN Karla Danisha Stives, PA-C      ibuprofen  400 mg Oral Q6H PRN Karla Danisha Stives, PA-C      ibuprofen  600 mg Oral Q8H PRN Karla Danisha Stives, PA-C      lidocaine  2 patch Topical Daily Brandi RachelAnaGrande, CRNP      LORazepam  1 mg Intramuscular Q6H PRN Max 3/day Karla Danisha Stives, PA-C      melatonin  3 mg Oral HS PRN Karla Danisha Stives, PA-C      melatonin  6 mg Oral HS Karla Danisha Stives, PA-C      nicotine polacrilex  2 mg Oral Q2H PRN Rocio Yulisa Richardson, CRNP      nortriptyline  25 mg Oral Daily Karla Danisha Stives, PA-C      OLANZapine  5 mg Intramuscular Q3H PRN Max 3/day Karla Danisha Stives, PA-C      OLANZapine  15 mg Oral HS Vinay Arenas, DO      OLANZapine  2.5 mg Oral Q4H PRN Max 6/day Karla Danisha Stives, PA-C      OLANZapine  5 mg Oral Q4H PRN Max 3/day Karla Danisha Stives, PA-C      OLANZapine  5 mg Oral Q3H PRN Max 3/day Karla Danisha Stives, PA-C      pantoprazole  40 mg Oral Early Morning Karla Danisha Stives, PA-C      polyethylene glycol  17 g Oral Daily PRN Karla Danisha Stives, PA-C      propranolol  5 mg Oral Q8H PRN Karla Danisha Stives, PA-C      senna-docusate sodium  1 tablet Oral Daily PRN Karla Danisha Stives, PA-C      traZODone  100 mg Oral Q6H PRN Max 3/day Vinay Arenas DO         Behavioral Health Medications:   all current active meds have been reviewed.    Laboratory results:  I have personally reviewed all pertinent laboratory/tests results.   Recent Results  (from the past 48 hour(s))   Magnesium    Collection Time: 05/09/24  6:18 AM   Result Value Ref Range    Magnesium 1.8 (L) 1.9 - 2.7 mg/dL   Phosphorus    Collection Time: 05/09/24  6:18 AM   Result Value Ref Range    Phosphorus 3.1 2.7 - 4.5 mg/dL   Basic metabolic panel    Collection Time: 05/09/24  6:18 AM   Result Value Ref Range    Sodium 137 135 - 147 mmol/L    Potassium 4.3 3.5 - 5.3 mmol/L    Chloride 105 96 - 108 mmol/L    CO2 23 21 - 32 mmol/L    ANION GAP 9 4 - 13 mmol/L    BUN 16 5 - 25 mg/dL    Creatinine 0.98 0.60 - 1.30 mg/dL    Glucose 102 65 - 140 mg/dL    Glucose, Fasting 102 (H) 65 - 99 mg/dL    Calcium 9.4 8.4 - 10.2 mg/dL    eGFR 88 ml/min/1.73sq tee Arenas DO 05/10/24

## 2024-05-10 NOTE — PLAN OF CARE
Problem: Alteration in Thoughts and Perception  Goal: Verbalize thoughts and feelings  Description: Interventions:  - Promote a nonjudgmental and trusting relationship with the patient through active listening and therapeutic communication  - Assess patient's level of functioning, behavior and potential for risk  - Engage patient in 1 on 1 interactions  - Encourage patient to express fears, feelings, frustrations, and discuss symptoms    - [place] patient to reality, help patient recognize reality-based thinking   - Administer medications as ordered and assess for potential side effects  - Provide the patient education related to the signs and symptoms of the illness and desired effects of prescribed medications  5/10/2024 0525 by Prabhjot Groves RN  Outcome: Progressing  5/10/2024 0525 by Prabhjot Groves RN  Outcome: Progressing     Problem: Alteration in Thoughts and Perception  Goal: Agree to be compliant with medication regime, as prescribed and report medication side effects  Description: Interventions:  - Offer appropriate PRN medication and supervise ingestion; conduct AIMS, as needed   5/10/2024 0525 by Prabhjot Groves RN  Outcome: Progressing  5/10/2024 0525 by Prabhjot Groves RN  Outcome: Progressing     Problem: Alteration in Thoughts and Perception  Goal: Attend and participate in unit activities, including therapeutic, recreational, and educational groups  Description: Interventions:  -Encourage Visitation and family involvement in care  5/10/2024 0525 by Prabhjot Groves RN  Outcome: Progressing  5/10/2024 0525 by Prabhjot Groves RN  Outcome: Progressing     Problem: Alteration in Thoughts and Perception  Goal: Complete daily ADLs, including personal hygiene independently, as able  Description: Interventions:  - Observe, teach, and assist patient with ADLS  - Monitor and promote a balance of rest/activity, with adequate nutrition and elimination   5/10/2024 0525 by Prabhjot Groves RN  Outcome:  Progressing  5/10/2024 0525 by Prabhjot Groves RN  Outcome: Progressing     Problem: Ineffective Coping  Goal: Participates in unit activities  Description: Interventions:  - Provide therapeutic environment   - Provide required programming   - Redirect inappropriate behaviors   5/10/2024 0525 by Prabhjot Groves RN  Outcome: Progressing  5/10/2024 0525 by Prabhjot Groves RN  Outcome: Progressing

## 2024-05-13 NOTE — CASE MANAGEMENT
CM rec'd call from [facility] at [facility]; pt accepted for admission to [facility]. [facility] to make arrangements for transport. CM rec'd call from [facility] confirming pt's  by [facility] transport at 1400. Pt to d/c with one weeks worth of meds.     CM met with pt, informed pt of acceptance by [facility] and reviewed IMM notice. Pt in agreement with d/c plan to [facility], verbalized understanding of IMM notice and signed IMM.     DC meds sent to [facility]. CM informed pt's insurance does not contract with [facility] pharmacy. Due to need for pt to d/c with meds in hand, med fill approved with use of Rx discount card. Financial form signed by Alva ROSAS RN [facility], cost $95.20    [facility] picked up pt's meds.